# Patient Record
Sex: FEMALE | Race: WHITE | Employment: UNEMPLOYED | URBAN - METROPOLITAN AREA
[De-identification: names, ages, dates, MRNs, and addresses within clinical notes are randomized per-mention and may not be internally consistent; named-entity substitution may affect disease eponyms.]

---

## 2020-10-23 ENCOUNTER — HOSPITAL ENCOUNTER (EMERGENCY)
Age: 25
Discharge: HOME | End: 2020-10-23
Attending: EMERGENCY MEDICINE
Payer: COMMERCIAL

## 2020-10-23 VITALS
TEMPERATURE: 98.3 F | OXYGEN SATURATION: 100 % | DIASTOLIC BLOOD PRESSURE: 73 MMHG | RESPIRATION RATE: 12 BRPM | WEIGHT: 175 LBS | BODY MASS INDEX: 27.47 KG/M2 | SYSTOLIC BLOOD PRESSURE: 111 MMHG | HEART RATE: 71 BPM | HEIGHT: 67 IN

## 2020-10-23 PROCEDURE — 99282 EMERGENCY DEPT VISIT SF MDM: CPT

## 2020-10-23 RX ORDER — AMOXICILLIN 500 MG/1
500 CAPSULE ORAL 2 TIMES DAILY
Qty: 10 CAPSULE | Refills: 0 | Status: SHIPPED | OUTPATIENT
Start: 2020-10-23 | End: 2020-10-28

## 2020-10-23 RX ORDER — KETOROLAC TROMETHAMINE 10 MG/1
10 TABLET, FILM COATED ORAL EVERY 6 HOURS PRN
Qty: 20 TABLET | Refills: 0 | Status: SHIPPED | OUTPATIENT
Start: 2020-10-23

## 2020-10-23 ASSESSMENT — PAIN SCALES - GENERAL
PAINLEVEL_OUTOF10: 7
PAINLEVEL_OUTOF10: 7

## 2020-10-23 ASSESSMENT — PAIN DESCRIPTION - PAIN TYPE
TYPE: ACUTE PAIN
TYPE: ACUTE PAIN

## 2020-10-23 ASSESSMENT — PAIN DESCRIPTION - DESCRIPTORS
DESCRIPTORS: THROBBING
DESCRIPTORS: THROBBING

## 2020-10-23 ASSESSMENT — PAIN DESCRIPTION - LOCATION
LOCATION: TEETH
LOCATION: MOUTH

## 2020-10-23 ASSESSMENT — PAIN - FUNCTIONAL ASSESSMENT: PAIN_FUNCTIONAL_ASSESSMENT: 0-10

## 2020-10-23 ASSESSMENT — PAIN DESCRIPTION - ORIENTATION: ORIENTATION: LOWER;UPPER

## 2020-10-23 NOTE — ED PROVIDER NOTES
Triage Chief Complaint:   Dental Pain (wisdom teeth etracted, pain and swelling increasing. Dentist not answering phone, closed today for cleaning. Is 7 weeks post partum, breast feeding infant)    [place]:  Cinda Ng is a 26 y.o. female that presents for evaluation of dental pain where she had her 4 wisdom teeth extracted within the last week. She was not discharged with any antibiotics or medication for pain and she has been taking Tylenol home but states the pain is still bothersome. Of note she is postpartum and breast-feeding. She has no other complaints. ROS:  At least 9 systems reviewed and otherwise acutely negative except as in the [address] No past medical history on file. No past surgical history on file. No family history on file.   Social History     Socioeconomic History    Marital status:      Spouse name: Not on file    Number of children: Not on file    Years of education: Not on file    Highest education level: Not on file   Occupational History    Not on file   Social Needs    Financial resource strain: Not on file    Food insecurity     Worry: Not on file     Inability: Not on file    Transportation needs     Medical: Not on file     Non-medical: Not on file   Tobacco Use    Smoking status: Current Some Day Smoker    Smokeless tobacco: Never Used   Substance and Sexual Activity    Alcohol use: Yes     Comment: social    Drug use: No    Sexual activity: Not on file   Lifestyle    Physical activity     Days per week: Not on file     Minutes per session: Not on file    Stress: Not on file   Relationships    Social connections     Talks on phone: Not on file     Gets together: Not on file     Attends Orthodoxy service: Not on file     Active member of club or organization: Not on file     Attends meetings of clubs or organizations: Not on file     Relationship status: Not on file    Intimate partner violence     Fear of current or ex partner: Not on file Emotionally abused: Not on file     Physically abused: Not on file     Forced sexual activity: Not on file   Other Topics Concern    Not on file   Social History Narrative    Not on file     No current facility-administered medications for this encounter. Current Outpatient Medications   Medication Sig Dispense Refill    amoxicillin (AMOXIL) 500 MG capsule Take 1 capsule by mouth 2 times daily for 5 days 10 capsule 0    ketorolac (TORADOL) 10 MG tablet Take 1 tablet by mouth every 6 hours as needed for Pain 20 tablet 0     No Known Allergies    [unfilled]    Nursing Notes Reviewed    Physical Exam:  Vitals:    10/23/20 0916   BP: 111/73   Pulse: 71   Resp: 12   Temp: 98.3 °F (36.8 °C)   SpO2: 100%       GENERAL APPEARANCE: Awake and alert. Cooperative. No acute distress. HEAD: Normocephalic. Atraumatic. EYES: EOM's grossly intact. Sclera anicteric. ENT: Mucous membranes are moist. Tolerates saliva. No trismus. Patient is status post extraction of all 4 wisdom teeth and the sites are unremarkable with no pus no active bleeding  NECK: Supple. No meningismus. Trachea midline. HEART: RRR. Radial pulses 2+. LUNGS: Respirations unlabored. CTAB  ABDOMEN: Soft. Non-tender. No guarding or rebound. EXTREMITIES: No acute deformities. SKIN: Warm and dry. NEUROLOGICAL: No gross facial drooping. Moves all 4 extremities spontaneously. PSYCHIATRIC: Normal mood. I have reviewed and interpreted all of the currently available lab results from this visit (if applicable):  No results found for this visit on 10/23/20.      Radiographs (if obtained):  [] The following radiograph was interpreted by myself in the absence of a radiologist:  [x] Radiologist's Report Reviewed:  n/a    EKG (if obtained): (All EKG's are interpreted by myself in the absence of a cardiologist)  Initial EKG on my interpretation shows n/a    MDM:  Differential diagnosis: Postoperative pain, dental infection, dental pain    The extraction sites are fairly unremarkable however secondary to her pain I will prescribe her a short course of amoxicillin. I have also prescribed her Toradol for pain as I did offer her Norco but explained that all medications are to some degree present in breastmilk and therefore she opted for an agent that did not contain opiate so I told her that she can continue her acetaminophen and also alternate with doses of Toradol. Patient told to follow-up with her dentist who did the procedure for further evaluation. Patient did not have a hoarse voice. No goiter. There was no tenderness on thyroid. There was no cervical lymphadenopathy. There is no trismus. There was no Maxwell angina. Patient was speaking in complete sentences without difficulty. No respiratory distress. There was no JVD. There was no induration or fluctuation. There was no cellulitis. Abdomen was soft and nontender. Patient was neurovascularly intact. I feel patient is appropriate and safe for discharge home. Physical exam benign. Patient has no stridor and no airway compromise. The patient is adequately hydrated, clinically nontoxic, and does not have any findings that would suggest impending airway issues. I do not suspect peritonsillar abscess, retropharyngeal abscess, epiglottitis or other more emergent etiology. There is no hot potato voice. Patient is advised to follow-up with their family doctor within the next 24-48 hours and return to the emergency department with any concerns. Patient istructed that long-term poor management of dental conditions can lead to endocarditis, myocarditis, sepsis and even death. Discussed results, diagnosis and plan with patient and/or family. Questions addressed. Disposition and follow-up agreed upon. Specific discharge instructions explained.  The patient and/or family and I have discussed the diagnosis and risks, and we agree with discharging home to follow-up with their primary care, specialist or referral doctor. In the event that medications were prescribed the risk profile of these medications were detailed expressly. We also discussed returning to the Emergency Department immediately if new or worsening symptoms occur. We have discussed the symptoms which are most concerning that necessitate immediate return. Old records reviewed. Labs and imaging reviewed and results discussed with patient. .        Patient was given scripts for the following medications. I counseled patient how to take these medications. New Prescriptions    AMOXICILLIN (AMOXIL) 500 MG CAPSULE    Take 1 capsule by mouth 2 times daily for 5 days    KETOROLAC (TORADOL) 10 MG TABLET    Take 1 tablet by mouth every 6 hours as needed for Pain         CRITICAL CARE TIME   Total Critical Care time was 0 minutes, excluding separately reportable procedures. There was a high probability of clinically significant/life threatening deterioration in the patient's condition which required my urgent intervention. Clinical Impression:  1.  Pain, dental       (Please note that portions of this note may have been completed with a voice recognition program. Efforts were made to edit the dictations but occasionally words are mis-transcribed.)    MD Marci Thompson MD  10/23/20 5756

## 2020-10-24 ENCOUNTER — HOSPITAL ENCOUNTER (EMERGENCY)
Age: 25
Discharge: HOME | End: 2020-10-24
Attending: EMERGENCY MEDICINE
Payer: COMMERCIAL

## 2020-10-24 VITALS
WEIGHT: 180.56 LBS | BODY MASS INDEX: 28.34 KG/M2 | HEIGHT: 67 IN | HEART RATE: 62 BPM | TEMPERATURE: 97.9 F | OXYGEN SATURATION: 99 % | SYSTOLIC BLOOD PRESSURE: 110 MMHG | DIASTOLIC BLOOD PRESSURE: 70 MMHG | RESPIRATION RATE: 16 BRPM

## 2020-10-24 PROCEDURE — 99282 EMERGENCY DEPT VISIT SF MDM: CPT

## 2020-10-24 PROCEDURE — 6370000000 HC RX 637 (ALT 250 FOR IP): Performed by: EMERGENCY MEDICINE

## 2020-10-24 RX ORDER — HYDROCODONE BITARTRATE AND ACETAMINOPHEN 5; 325 MG/1; MG/1
1 TABLET ORAL ONCE
Status: COMPLETED | OUTPATIENT
Start: 2020-10-24 | End: 2020-10-24

## 2020-10-24 RX ORDER — HYDROCODONE BITARTRATE AND ACETAMINOPHEN 5; 325 MG/1; MG/1
1 TABLET ORAL EVERY 8 HOURS PRN
Qty: 10 TABLET | Refills: 0 | Status: SHIPPED | OUTPATIENT
Start: 2020-10-24 | End: 2020-10-27

## 2020-10-24 RX ADMIN — HYDROCODONE BITARTRATE AND ACETAMINOPHEN 1 TABLET: 5; 325 TABLET ORAL at 12:21

## 2020-10-24 ASSESSMENT — PAIN DESCRIPTION - FREQUENCY: FREQUENCY: CONTINUOUS

## 2020-10-24 ASSESSMENT — PAIN SCALES - GENERAL
PAINLEVEL_OUTOF10: 8
PAINLEVEL_OUTOF10: 8

## 2020-10-24 ASSESSMENT — PAIN DESCRIPTION - PAIN TYPE: TYPE: ACUTE PAIN

## 2020-10-24 ASSESSMENT — PAIN DESCRIPTION - LOCATION: LOCATION: MOUTH

## 2020-10-24 NOTE — ED PROVIDER NOTES
CHIEF COMPLAINT  Dental Pain (Had wisdom teeth removed Monday. Was here yesterday. States Toradol is not helping pain.)      HISTORY OF PRESENT ILLNESS  Elio Vora is a 26 y.o. female who presents to the ED complaining of pain in her mandible on the left side after having wisdom tooth extraction performed on 10/19/2020. Patient states that she try to go back to her dentist office yesterday but they were cleaned in the office and therefore not open. Patient states she was seen yesterday in the emergency room for evaluation and was started on amoxicillin as well as Toradol. Patient was offered Norco at that time but chose not to as she was breast-feeding. States she went home and tried using the Toradol without relief of her pain and therefore came back to the emergency room to discuss possible Norco prescription. Patient denies any fevers or chills. No nausea or vomiting. No difficulty swallowing. States she does have breastmilk reserve which she can use instead of breast-feeding while she is taking the pain medication. Does plan to follow-up with her dentist on Monday. No other complaints, modifying factors or associated symptoms. Nursing notes reviewed. History reviewed. No pertinent past medical history. Past Surgical History:   Procedure Laterality Date    APPENDECTOMY      WISDOM TOOTH EXTRACTION       History reviewed. No pertinent family history.   Social History     Socioeconomic History    Marital status:      Spouse name: Not on file    Number of children: Not on file    Years of education: Not on file    Highest education level: Not on file   Occupational History    Not on file   Social Needs    Financial resource strain: Not on file    Food insecurity     Worry: Not on file     Inability: Not on file    Transportation needs     Medical: Not on file     Non-medical: Not on file   Tobacco Use    Smoking status: Former Smoker    Smokeless tobacco: Never Used Substance and Sexual Activity    Alcohol use: Yes     Comment: social    Drug use: No    Sexual activity: Not on file   Lifestyle    Physical activity     Days per week: Not on file     Minutes per session: Not on file    Stress: Not on file   Relationships    Social connections     Talks on phone: Not on file     Gets together: Not on file     Attends Christian service: Not on file     Active member of club or organization: Not on file     Attends meetings of clubs or organizations: Not on file     Relationship status: Not on file    Intimate partner violence     Fear of current or ex partner: Not on file     Emotionally abused: Not on file     Physically abused: Not on file     Forced sexual activity: Not on file   Other Topics Concern    Not on file   Social History Narrative    Not on file     Current Facility-Administered Medications   Medication Dose Route Frequency Provider Last Rate Last Dose    HYDROcodone-acetaminophen (NORCO) 5-325 MG per tablet 1 tablet  1 tablet Oral Once Juan Dawn MD         Current Outpatient Medications   Medication Sig Dispense Refill    HYDROcodone-acetaminophen (NORCO) 5-325 MG per tablet Take 1 tablet by mouth every 8 hours as needed for Pain for up to 3 days. Intended supply: 3 days.  Take lowest dose possible to manage pain 10 tablet 0    amoxicillin (AMOXIL) 500 MG capsule Take 1 capsule by mouth 2 times daily for 5 days 10 capsule 0    ketorolac (TORADOL) 10 MG tablet Take 1 tablet by mouth every 6 hours as needed for Pain 20 tablet 0     No Known Allergies      REVIEW OF SYSTEMS  10 systems reviewed, pertinent positives per HPI otherwise noted to be negative    PHYSICAL EXAM  /70   Pulse 62   Temp 97.9 °F (36.6 °C) (Oral)   Resp 16   Ht 5' 7\" (1.702 m)   Wt 180 lb 8.9 oz (81.9 kg)   SpO2 99%   BMI 28.28 kg/m²      CONSTITUTIONAL: AOx4, cooperative with exam, afebrile   HEAD: normocephalic, atraumatic   EYES: PERRL, EOMI, anicteric sclera ENT: Moist mucous membranes, uvula midline, no swelling of the posterior pharynx, no evidence of abscess, trismus. Status post extraction of 4 wisdom teeth with sites unremarkable without purulence, fluctuance or bleeding   NECK: Supple, symmetric, trachea midline, no meningismus   LUNGS: Bilateral breath sounds, CTAB, no rales/ronchi/wheezes   CARDIOVASCULAR: RRR, normal S1/S2, no m/r/g, 2+ pulses throughout   ABDOMEN: Soft, non-tender, non-distended, +BS   NEUROLOGIC:  MAEx4, GCS 15   MUSCULOSKELETAL: No clubbing, cyanosis or edema   SKIN: No rash, pallor or wounds on exposed surfaces         RADIOLOGY  X-RAYS:  I have reviewed radiologic plain film image(s). ALL OTHER NON-PLAIN FILM IMAGES SUCH AS CT, ULTRASOUND AND MRI HAVE BEEN READ BY THE RADIOLOGIST. No orders to display          EKG INTERPRETATION  None    PROCEDURES    ED COURSE/MDM  Dry socket, postop pain, infection  Patient seen and evaluated. History and physical as above. Nontoxic, afebrile. Patient with postop pain after wisdom tooth removal.  No evidence of abscess, Vianey's angina or trismus. Patient tolerating her secretions and speaking full sentences. Will provide short course of Norco for pain control to bridge patient and through the weekend to get into see her dentist.  Did discuss that the opiate medication will be in patient's breastmilk and that she should pump and dump and use a reserve. Patient states understanding. Strict return instructions provided. All questions answered prior to discharge. I estimate there is LOW risk for a ANAPHYLAXIS, DEEP SPACE INFECTION (e.g., TAMIKOS ANGINA OR RETROPHARYNGEAL ABSCESS), EPIGLOTTITIS, MENINGITIS, or AIRWAY COMPROMISE, thus I consider the discharge disposition reasonable. Also, there is no evidence or peritonitis, sepsis, or toxicity. Jerzy and I have discussed the diagnosis and risks, and we agree with discharging home to follow-up with their primary doctor.  We also

## 2020-10-24 NOTE — ED NOTES
Acknowledged pt by pt's name. Verified pt by name and date of birth. Checked arm band, allergies, reviewed past medical history. Introduced myself to patient  Duration of ED plan of care explained to patient  Explained planned tests and procedures  Thanked patient for coming to [facility].    Asked if there was anything else I could do for the patient before exiting room. CB in reach.      Nimisha Jay RN  10/24/20 4677

## 2024-03-14 LAB
ABO, EXTERNAL RESULT: NORMAL
HEP B, EXTERNAL RESULT: NORMAL
HEPATITIS C ANTIBODY, EXTERNAL RESULT: NORMAL
HIV, EXTERNAL RESULT: NORMAL
RH FACTOR, EXTERNAL RESULT: NORMAL
RUBELLA TITER, EXTERNAL RESULT: NORMAL

## 2024-04-25 ENCOUNTER — APPOINTMENT (OUTPATIENT)
Dept: ULTRASOUND IMAGING | Age: 29
End: 2024-04-25
Payer: COMMERCIAL

## 2024-04-25 ENCOUNTER — HOSPITAL ENCOUNTER (OUTPATIENT)
Age: 29
Discharge: HOME | End: 2024-04-25
Attending: STUDENT IN AN ORGANIZED HEALTH CARE EDUCATION/TRAINING PROGRAM | Admitting: OBSTETRICS & GYNECOLOGY
Payer: COMMERCIAL

## 2024-04-25 VITALS
SYSTOLIC BLOOD PRESSURE: 105 MMHG | TEMPERATURE: 97.6 F | DIASTOLIC BLOOD PRESSURE: 59 MMHG | WEIGHT: 189.82 LBS | RESPIRATION RATE: 14 BRPM | HEIGHT: 67 IN | BODY MASS INDEX: 29.79 KG/M2 | HEART RATE: 71 BPM | OXYGEN SATURATION: 98 %

## 2024-04-25 DIAGNOSIS — R10.13 EPIGASTRIC PAIN: Primary | ICD-10-CM

## 2024-04-25 PROBLEM — K80.20 CALCULUS OF GALLBLADDER WITHOUT CHOLECYSTITIS WITHOUT OBSTRUCTION: Status: ACTIVE | Noted: 2024-04-25

## 2024-04-25 PROBLEM — R10.9 ABDOMINAL PAIN: Status: ACTIVE | Noted: 2024-04-25

## 2024-04-25 LAB
ABO + RH BLD: NORMAL
ALBUMIN SERPL-MCNC: 3.7 G/DL (ref 3.4–5)
ALP SERPL-CCNC: 69 U/L (ref 40–129)
ALT SERPL-CCNC: 51 U/L (ref 10–40)
AMORPHOUS: PRESENT
AMYLASE SERPL-CCNC: 66 U/L (ref 25–115)
AST SERPL-CCNC: 75 U/L (ref 15–37)
BACTERIA URNS QL MICRO: ABNORMAL /HPF
BILIRUB DIRECT SERPL-MCNC: <0.2 MG/DL (ref 0–0.3)
BILIRUB INDIRECT SERPL-MCNC: ABNORMAL MG/DL (ref 0–1)
BILIRUB SERPL-MCNC: 0.6 MG/DL (ref 0–1)
BILIRUB UR QL STRIP.AUTO: NEGATIVE
BLD GP AB SCN SERPL QL: NORMAL
CLARITY UR: ABNORMAL
COLOR UR: YELLOW
DEPRECATED RDW RBC AUTO: 14.3 % (ref 12.4–15.4)
EPI CELLS #/AREA URNS AUTO: 4 /HPF (ref 0–5)
GLUCOSE UR STRIP.AUTO-MCNC: NEGATIVE MG/DL
HCT VFR BLD AUTO: 33.3 % (ref 36–48)
HGB BLD-MCNC: 11.6 G/DL (ref 12–16)
HGB UR QL STRIP.AUTO: NEGATIVE
HYALINE CASTS #/AREA URNS AUTO: 0 /LPF (ref 0–8)
KETONES UR STRIP.AUTO-MCNC: NEGATIVE MG/DL
LEUKOCYTE ESTERASE UR QL STRIP.AUTO: ABNORMAL
LIPASE SERPL-CCNC: 20 U/L (ref 13–60)
MCH RBC QN AUTO: 29.1 PG (ref 26–34)
MCHC RBC AUTO-ENTMCNC: 34.8 G/DL (ref 31–36)
MCV RBC AUTO: 83.5 FL (ref 80–100)
NITRITE UR QL STRIP.AUTO: NEGATIVE
PH UR STRIP.AUTO: 8 [PH] (ref 5–8)
PLATELET # BLD AUTO: 213 K/UL (ref 135–450)
PMV BLD AUTO: 8.4 FL (ref 5–10.5)
PROT SERPL-MCNC: 6.5 G/DL (ref 6.4–8.2)
PROT UR STRIP.AUTO-MCNC: NEGATIVE MG/DL
RBC # BLD AUTO: 3.98 M/UL (ref 4–5.2)
RBC CLUMPS #/AREA URNS AUTO: 2 /HPF (ref 0–4)
SP GR UR STRIP.AUTO: 1.01 (ref 1–1.03)
UA DIPSTICK W REFLEX MICRO PNL UR: YES
URN SPEC COLLECT METH UR: ABNORMAL
UROBILINOGEN UR STRIP-ACNC: 1 E.U./DL
WBC # BLD AUTO: 8.9 K/UL (ref 4–11)
WBC #/AREA URNS AUTO: 1 /HPF (ref 0–5)

## 2024-04-25 PROCEDURE — 96361 HYDRATE IV INFUSION ADD-ON: CPT

## 2024-04-25 PROCEDURE — 82150 ASSAY OF AMYLASE: CPT

## 2024-04-25 PROCEDURE — 99202 OFFICE O/P NEW SF 15 MIN: CPT

## 2024-04-25 PROCEDURE — 76705 ECHO EXAM OF ABDOMEN: CPT

## 2024-04-25 PROCEDURE — 2580000003 HC RX 258: Performed by: OBSTETRICS & GYNECOLOGY

## 2024-04-25 PROCEDURE — 86901 BLOOD TYPING SEROLOGIC RH(D): CPT

## 2024-04-25 PROCEDURE — 83690 ASSAY OF LIPASE: CPT

## 2024-04-25 PROCEDURE — 2500000003 HC RX 250 WO HCPCS: Performed by: OBSTETRICS & GYNECOLOGY

## 2024-04-25 PROCEDURE — 6360000002 HC RX W HCPCS: Performed by: OBSTETRICS & GYNECOLOGY

## 2024-04-25 PROCEDURE — 85027 COMPLETE CBC AUTOMATED: CPT

## 2024-04-25 PROCEDURE — 86900 BLOOD TYPING SEROLOGIC ABO: CPT

## 2024-04-25 PROCEDURE — APPNB15 APP NON BILLABLE TIME 0-15 MINS: Performed by: PHYSICIAN ASSISTANT

## 2024-04-25 PROCEDURE — A4216 STERILE WATER/SALINE, 10 ML: HCPCS | Performed by: OBSTETRICS & GYNECOLOGY

## 2024-04-25 PROCEDURE — 96375 TX/PRO/DX INJ NEW DRUG ADDON: CPT

## 2024-04-25 PROCEDURE — 99285 EMERGENCY DEPT VISIT HI MDM: CPT

## 2024-04-25 PROCEDURE — 86850 RBC ANTIBODY SCREEN: CPT

## 2024-04-25 PROCEDURE — 96365 THER/PROPH/DIAG IV INF INIT: CPT

## 2024-04-25 PROCEDURE — 80076 HEPATIC FUNCTION PANEL: CPT

## 2024-04-25 PROCEDURE — 99254 IP/OBS CNSLTJ NEW/EST MOD 60: CPT | Performed by: STUDENT IN AN ORGANIZED HEALTH CARE EDUCATION/TRAINING PROGRAM

## 2024-04-25 PROCEDURE — 81001 URINALYSIS AUTO W/SCOPE: CPT

## 2024-04-25 PROCEDURE — APPSS15 APP SPLIT SHARED TIME 0-15 MINUTES: Performed by: PHYSICIAN ASSISTANT

## 2024-04-25 RX ORDER — ONDANSETRON 2 MG/ML
4 INJECTION INTRAMUSCULAR; INTRAVENOUS ONCE
Status: COMPLETED | OUTPATIENT
Start: 2024-04-25 | End: 2024-04-25

## 2024-04-25 RX ORDER — SODIUM CHLORIDE, SODIUM LACTATE, POTASSIUM CHLORIDE, AND CALCIUM CHLORIDE .6; .31; .03; .02 G/100ML; G/100ML; G/100ML; G/100ML
1000 INJECTION, SOLUTION INTRAVENOUS ONCE
Status: COMPLETED | OUTPATIENT
Start: 2024-04-25 | End: 2024-04-25

## 2024-04-25 RX ORDER — SODIUM CHLORIDE, SODIUM LACTATE, POTASSIUM CHLORIDE, CALCIUM CHLORIDE 600; 310; 30; 20 MG/100ML; MG/100ML; MG/100ML; MG/100ML
INJECTION, SOLUTION INTRAVENOUS CONTINUOUS
Status: DISCONTINUED | OUTPATIENT
Start: 2024-04-25 | End: 2024-04-25 | Stop reason: HOSPADM

## 2024-04-25 RX ORDER — ONDANSETRON 4 MG/1
4 TABLET, FILM COATED ORAL DAILY PRN
Qty: 30 TABLET | Refills: 0 | Status: SHIPPED | OUTPATIENT
Start: 2024-04-25

## 2024-04-25 RX ADMIN — HYDROMORPHONE HYDROCHLORIDE 0.5 MG: 1 INJECTION, SOLUTION INTRAMUSCULAR; INTRAVENOUS; SUBCUTANEOUS at 06:37

## 2024-04-25 RX ADMIN — FAMOTIDINE 20 MG: 10 INJECTION, SOLUTION INTRAVENOUS at 06:40

## 2024-04-25 RX ADMIN — ONDANSETRON 4 MG: 2 INJECTION INTRAMUSCULAR; INTRAVENOUS at 06:39

## 2024-04-25 RX ADMIN — SODIUM CHLORIDE, POTASSIUM CHLORIDE, SODIUM LACTATE AND CALCIUM CHLORIDE 1000 ML: 600; 310; 30; 20 INJECTION, SOLUTION INTRAVENOUS at 06:41

## 2024-04-25 RX ADMIN — Medication 12.5 MG: at 10:05

## 2024-04-25 RX ADMIN — SODIUM CHLORIDE, POTASSIUM CHLORIDE, SODIUM LACTATE AND CALCIUM CHLORIDE: 600; 310; 30; 20 INJECTION, SOLUTION INTRAVENOUS at 10:04

## 2024-04-25 ASSESSMENT — PAIN DESCRIPTION - DESCRIPTORS: DESCRIPTORS: ACHING;DULL

## 2024-04-25 ASSESSMENT — PAIN DESCRIPTION - LOCATION: LOCATION: OTHER (COMMENT)

## 2024-04-25 ASSESSMENT — PAIN SCALES - GENERAL: PAINLEVEL_OUTOF10: 5

## 2024-04-25 NOTE — PLAN OF CARE
Problem: Pain  Goal: Verbalizes/displays adequate comfort level or baseline comfort level  4/25/2024 1344 by Rosalia Ro, RN  Outcome: Completed  4/25/2024 0746 by Rosalia Ro, RN  Outcome: Progressing

## 2024-04-25 NOTE — ED NOTES
This RN spoke to 2N OB Charge RN regarding patient's transport to OB. States will come and get patient from ED.

## 2024-04-25 NOTE — PROGRESS NOTES
Pt still c/o nausea and vomiting.  RUQ u/s showing cholelithiasis.   LFTs are elevated.  Will consult general surgery.

## 2024-04-25 NOTE — FLOWSHEET NOTE
Patient to triage with c/o Epigastric pain that radiates to RUQ.  Last ate at 2100,  Emesis x2 with last episode at 0100.  Denies diarrhea. Denies vaginal bleeding.  Verbalized positive FM.  Confirmed FHR of 140's.  Denies contractions, palpates soft.  Rebound tenderness and guarding noted to RUQ.

## 2024-04-25 NOTE — ED TRIAGE NOTES
Pt presents to ED with a c/o upper abd pain that started around 2130 last night. Around 0100 this morning \"the pain started to get really bad\". Pt is currently 21 weeks 3 days pregnant. Pt also reports vomiting x2 at 0100. Pt denies blood in vomit. Pt denies diarrhea. 6/10 pain in upper abdomen; describes pain as a \"gas pain\" and states it's a constant dull pain. Pt denies vaginal bleeding. Pt states she's only tried TUMs at home.

## 2024-04-25 NOTE — FLOWSHEET NOTE
Dr Potts updated regarding patient assessment.  Dr Potts spoke with patient.  RUQ ultrasound ordered to r/o gallstones.  Patient advised of POC and verbalized agreement at this time.

## 2024-04-25 NOTE — FLOWSHEET NOTE
Pt remains NPO pending surgery consult. IVF started and dose of phenergan given for c/o nausea and vomiting. See mar

## 2024-04-25 NOTE — PROGRESS NOTES
Pain is better, just dull. Nausea persists.   After discussion with gen surg, plan is to d/c home, low fat diet. Pt will call if sx worsen again. Will attempt to delay surgery until after pregnancy.

## 2024-04-25 NOTE — PROGRESS NOTES
27 yo  @ 21 weeks presented to ER with c/p epig/RUQ pain, heart tali, nausea and 2 episodes of emesis. Had spicy food for dinner last night. Had GB issues with last pregnancy. No pregnancy related complains, denies LOF, ctrxs, VB, baby is active.   Was seen in ER earlier but sent to us for management.   Will get liver panel, treat nausea, heartburn and pain, which is 6/10 and pretty intense. GB US this am.

## 2024-04-25 NOTE — CONSULTS
Surgery Consult Note     HANSA Valerio,-C  Pt Name: Trace Argueta  MRN: [id number]  YOB: 1995  Date of evaluation: 4/25/2024  Primary Care Physician: No primary care provider on file.  Referring Physician. Dr. Johnson  Reason for Consultation: Cholelithiasis  Chief Complaint:Abdominal pain  IMPRESSIONS:   Abdominal pain. +Cholelithiasis on Ultrasound. Pain: gallbladder vs GERD?  Nausea and emesis  21 weeks pregnant   LFT's OK. ALT and AST slight elevated (ALT: 51 and AST: 75)  WBC count WNL: 8.9  PLANS:   Monitor and control pain  IVF  No general surgery plans at this time  Will monitor and give further recommendations as needed.   SUBJECTIVE:   History of Chief Complaint:    Trace Argueta is a 28 y.o. female who presented with abdominal pain, nausea and emesis. Her pain began yesterday and is located in the epigastric area. She is currently 21 weeks pregnant and had similar pain with her last pregnancy that resolved when she was no longer pregnant. An ultrasound was performed and that showed cholelithiasis but no signs of cholecystitis.  She does admits that the pain radiates to her back. She jese ever having an EGD in the past.   Past Medical History  Reviewed  has no past medical history on file.  Past Surgical History  Reviewed has a past surgical history that includes Appendectomy and Cando tooth extraction.  Medications  Prior to Admission medications    Medication Sig Start Date End Date Taking? Authorizing Provider   ketorolac (TORADOL) 10 MG tablet Take 1 tablet by mouth every 6 hours as needed for Pain  Patient not taking: Reported on 4/25/2024 10/23/20   Agustín Molina MD    Scheduled Meds:  Continuous Infusions:   lactated ringers IV soln       PRN Meds:.promethazine  Allergies  has No Known Allergies.  Family History  Reviewed. Noncontributory to current situation  Social History   reports that she has quit smoking. She has never used smokeless tobacco. She reports current  0.6   BILIDIR <0.2   AMYLASE 66   LIPASE 20.0     CBC:   Lab Results   Component Value Date/Time    WBC 8.9 04/25/2024 06:30 AM    RBC 3.98 04/25/2024 06:30 AM    HGB 11.6 04/25/2024 06:30 AM    HCT 33.3 04/25/2024 06:30 AM    MCV 83.5 04/25/2024 06:30 AM    MCH 29.1 04/25/2024 06:30 AM    MCHC 34.8 04/25/2024 06:30 AM    RDW 14.3 04/25/2024 06:30 AM     04/25/2024 06:30 AM    MPV 8.4 04/25/2024 06:30 AM     CMP:    Lab Results   Component Value Date/Time     04/26/2016 07:15 PM    K 3.9 04/26/2016 07:15 PM    CL 97 04/26/2016 07:15 PM    CO2 22 04/26/2016 07:15 PM    BUN 8 04/26/2016 07:15 PM    CREATININE 0.7 04/26/2016 07:15 PM    GFRAA >60 04/26/2016 07:15 PM    AGRATIO 1.2 04/26/2016 07:15 PM    LABGLOM >60 04/26/2016 07:15 PM    GLUCOSE 145 04/26/2016 07:15 PM    PROT 6.5 04/25/2024 06:30 AM    CALCIUM 9.5 04/26/2016 07:15 PM    BILITOT 0.6 04/25/2024 06:30 AM    ALKPHOS 69 04/25/2024 06:30 AM    AST 75 04/25/2024 06:30 AM    ALT 51 04/25/2024 06:30 AM     Urine Culture:  No components found for: \"CURINE\"  Blood Culture:  No components found for: \"CBLOOD\", \"CFUNGUSBL\"  RADIOLOGY:     US (4/25/24):   Cholelithiasis, without sonographic evidence of acute cholecystitis.     The liver measures in the upper limits of normal size.         Thank you for the interesting evaluation. Further recommendations to follow.    Deng Lim PA-C  General and Vascular Surgery [phone]  Electronically signed by Deng Taylor PA-C on 4/25/2024 at 10:02 AM

## 2024-04-25 NOTE — FLOWSHEET NOTE
After completion of the medical screening evaluation, it has been determined that a medical emergency does not exist and the patient is not in active labor. Therefore, the patient may be discharged home.    Patient given triage discharge instructions.  Patient instructed to drink plenty water everyday, to begin low fat diet and return to normal activity. Script sent to pt's pharmacy.  All pregnancy warning signs and symptoms reviewed.   Patient aware of when to follow up with providers and all questions answered.  Patient ambulatory home with instructions.

## 2024-04-25 NOTE — ED PROVIDER NOTES
--     Vitals:    04/25/24 0423   BP: 110/68   Pulse: 70   Resp: 18   Temp: 97.6 °F (36.4 °C)   SpO2: 98%         Physical Exam  Vitals and nursing note reviewed.   Constitutional:       General: She is not in acute distress.     Appearance: She is not ill-appearing, toxic-appearing or diaphoretic.   HENT:      Head: Normocephalic and atraumatic.      Right Ear: External ear normal.      Left Ear: External ear normal.      Nose: Nose normal.   Eyes:      Conjunctiva/sclera: Conjunctivae normal.   Cardiovascular:      Rate and Rhythm: Normal rate and regular rhythm.      Pulses: Normal pulses.      Heart sounds: Normal heart sounds. No murmur heard.     No friction rub. No gallop.   Pulmonary:      Effort: Pulmonary effort is normal. No respiratory distress.      Breath sounds: No stridor. No wheezing, rhonchi or rales.   Abdominal:      General: Abdomen is flat. Bowel sounds are normal.      Palpations: Abdomen is soft.      Tenderness: There is abdominal tenderness. There is no guarding or rebound.      Comments: Minimally gravid abdomen, epigastric tenderness to palpation   Skin:     General: Skin is warm and dry.      Capillary Refill: Capillary refill takes less than 2 seconds.   Neurological:      Mental Status: She is alert.           FORMAL DIAGNOSTIC RESULTS     EKG: All EKG's are interpreted by the Emergency Department Physician who either signs or Co-signs this chart in the absence of a cardiologist.        RADIOLOGY:   Non-plain film images such as CT, Ultrasound and MRI are read by the radiologist. Plainradiographic images are visualized and preliminarily interpreted by the  ED Provider with the belowfindings:        Interpretation per the Radiologist below, if available at the time of this note:    No orders to display     No results found.   No results found.     LABS: (none if blank)  Labs Reviewed - No data to display    (Any cultures that may have been sent were not resulted at the time of this        OUTPATIENT FOLLOW UP THE PATIENT:  No follow-up provider specified.      Electronically Signed: Madhav Wright MD, 04/25/24, 5:08 AM    This report has been produced using speech recognition software and may contain errors related to that system including errors in grammar, punctuation, and spelling, as well as words and phrases that may be inappropriate. If there are any questions or concerns please feel free to contact the dictating provider for clarification.       Madhav Wright MD  04/25/24 6128

## 2024-04-29 ENCOUNTER — TELEPHONE (OUTPATIENT)
Dept: SURGERY | Age: 29
End: 2024-04-29

## 2024-04-29 NOTE — TELEPHONE ENCOUNTER
PT was seen in ER over the weekend for gallstones and was told by Dr Perez if things get worse to call and let him know.  She states that she is feeling worse now and would like to know what the next plan of care would be.

## 2024-04-30 ENCOUNTER — OFFICE VISIT (OUTPATIENT)
Dept: SURGERY | Age: 29
End: 2024-04-30
Payer: COMMERCIAL

## 2024-04-30 VITALS — WEIGHT: 189 LBS | BODY MASS INDEX: 29.6 KG/M2

## 2024-04-30 DIAGNOSIS — K80.20 CALCULUS OF GALLBLADDER WITHOUT CHOLECYSTITIS WITHOUT OBSTRUCTION: Primary | ICD-10-CM

## 2024-04-30 PROCEDURE — G8419 CALC BMI OUT NRM PARAM NOF/U: HCPCS | Performed by: STUDENT IN AN ORGANIZED HEALTH CARE EDUCATION/TRAINING PROGRAM

## 2024-04-30 PROCEDURE — 1036F TOBACCO NON-USER: CPT | Performed by: STUDENT IN AN ORGANIZED HEALTH CARE EDUCATION/TRAINING PROGRAM

## 2024-04-30 PROCEDURE — 99215 OFFICE O/P EST HI 40 MIN: CPT | Performed by: STUDENT IN AN ORGANIZED HEALTH CARE EDUCATION/TRAINING PROGRAM

## 2024-04-30 PROCEDURE — G8427 DOCREV CUR MEDS BY ELIG CLIN: HCPCS | Performed by: STUDENT IN AN ORGANIZED HEALTH CARE EDUCATION/TRAINING PROGRAM

## 2024-04-30 NOTE — PROGRESS NOTES
GENERAL SURGERY  Follow-up Office Visit    Patient Name: Trace Argueta  MRN: [id number]  YOB: 1995   Date of Service: 4/30/2024      Chief Complaint   Patient presents with    New Patient     Ref by ED for gallbladder. Pt states she was seen in the ED due to having gallbladder symptoms, she has had couple more \"attacks\" after she was seen in the ED.       SUBJECTIVE:     Trace presents for follow-up of her gallbladder disease.  Of note, she is 21 weeks pregnant with her second child.  She had the similar issues during her first pregnancy, but they are worse and more prominent with this pregnancy.  She was seen by me recently in OB triage for the same.    After our last visit, she had another gallbladder attack on Friday of last week.  This persisted through the majority of the weekend.  She has not had another clear-cut attacks since that time.  She continues to report upper abdominal pain that is worse with eating.  For that reason, she has steered clear of oral intake.      REVIEW OF SYSTEMS  A comprehensive review of systems was completed and is negative except for any elements noted above.    No past medical history on file.  Past Surgical History:   Procedure Laterality Date    APPENDECTOMY      WISDOM TOOTH EXTRACTION       Prior to Admission medications    Medication Sig Start Date End Date Taking? Authorizing Provider   ondansetron (ZOFRAN) 4 MG tablet Take 1 tablet by mouth daily as needed for Nausea or Vomiting 4/25/24  Yes Lisseth Johnson MD   ketorolac (TORADOL) 10 MG tablet Take 1 tablet by mouth every 6 hours as needed for Pain  Patient not taking: Reported on 4/25/2024 10/23/20   Agustín Molina MD     Social History     Tobacco Use    Smoking status: Former    Smokeless tobacco: Never   Substance Use Topics    Alcohol use: Yes     Comment: social    Drug use: No     No family history on file.    OBJECTIVE:     Wt 85.7 kg (189 lb)   BMI 29.60 kg/m²     PHYSICAL

## 2024-05-01 ENCOUNTER — TELEPHONE (OUTPATIENT)
Dept: SURGERY | Age: 29
End: 2024-05-01

## 2024-05-01 NOTE — TELEPHONE ENCOUNTER
Pt saw Dr. Mccormick for consultation regarding her gallbladder while being pregnant. She is symptomatic and has been in and out of the ED because of this. She stated she feels more comfortable with Dr. Dover as he has more referrals, however next available appt is not until 5/13. Pt wants to know if she can be seen sooner and how long before she can get scheduled for surgery, she is aware she will hear from Shirley tomorrow.

## 2024-05-02 NOTE — TELEPHONE ENCOUNTER
Will reach out to her and schedule her to come in on Monday.     Patient is currently 22 weeks pregnant.

## 2024-05-06 ENCOUNTER — OFFICE VISIT (OUTPATIENT)
Dept: SURGERY | Age: 29
End: 2024-05-06
Payer: COMMERCIAL

## 2024-05-06 VITALS — SYSTOLIC BLOOD PRESSURE: 102 MMHG | DIASTOLIC BLOOD PRESSURE: 68 MMHG | HEART RATE: 84 BPM

## 2024-05-06 DIAGNOSIS — Z3A.23 23 WEEKS GESTATION OF PREGNANCY: ICD-10-CM

## 2024-05-06 DIAGNOSIS — K80.20 SYMPTOMATIC CHOLELITHIASIS: Primary | ICD-10-CM

## 2024-05-06 PROCEDURE — G8419 CALC BMI OUT NRM PARAM NOF/U: HCPCS | Performed by: SURGERY

## 2024-05-06 PROCEDURE — 99214 OFFICE O/P EST MOD 30 MIN: CPT | Performed by: SURGERY

## 2024-05-06 PROCEDURE — 1036F TOBACCO NON-USER: CPT | Performed by: SURGERY

## 2024-05-06 PROCEDURE — G8427 DOCREV CUR MEDS BY ELIG CLIN: HCPCS | Performed by: SURGERY

## 2024-05-06 RX ORDER — FERROUS SULFATE 325(65) MG
325 TABLET ORAL
COMMUNITY

## 2024-05-06 RX ORDER — M-VIT,TX,IRON,MINS/CALC/FOLIC 27MG-0.4MG
1 TABLET ORAL DAILY
COMMUNITY

## 2024-05-06 NOTE — PROGRESS NOTES
Results   Component Value Date/Time    WBC 8.9 2024 06:30 AM    RBC 3.98 2024 06:30 AM    HGB 11.6 2024 06:30 AM    HCT 33.3 2024 06:30 AM     2024 06:30 AM    MCV 83.5 2024 06:30 AM    MCH 29.1 2024 06:30 AM    MCHC 34.8 2024 06:30 AM    RDW 14.3 2024 06:30 AM    LYMPHOPCT 10.5 2016 07:15 PM    MONOPCT 6.6 2016 07:15 PM    EOSPCT 0.1 2016 07:15 PM    BASOPCT 0.1 2016 07:15 PM    MONOSABS 0.6 2016 07:15 PM    EOSABS 0.0 2016 07:15 PM    BASOSABS 0.0 2016 07:15 PM     BMP:    Lab Results   Component Value Date/Time     2016 07:15 PM    K 3.9 2016 07:15 PM    CL 97 2016 07:15 PM    CO2 22 2016 07:15 PM    BUN 8 2016 07:15 PM    CREATININE 0.7 2016 07:15 PM    CALCIUM 9.5 2016 07:15 PM    GFRAA >60 2016 07:15 PM    LABGLOM >60 2016 07:15 PM    GLUCOSE 145 2016 07:15 PM     Hepatic Function Panel:    Lab Results   Component Value Date/Time    ALKPHOS 69 2024 06:30 AM    ALT 51 2024 06:30 AM    AST 75 2024 06:30 AM    BILITOT 0.6 2024 06:30 AM    BILIDIR <0.2 2024 06:30 AM    IBILI see below 2024 06:30 AM       Imaging: RUQ US from 24 shows cholelithiasis, no GB wall thickening or pericholecystic fluid, CBD 2 mm    ASSESSMENT:     Diagnosis Orders   1. Symptomatic cholelithiasis        2. 23 weeks gestation of pregnancy              PLAN:    Ms. Argueta is 23 weeks pregnant with symptomatic cholelithiasis.  She is having significant episodes that are influencing her weight gain during pregnancy.  She is also at risk of acute cholecystitis, which may also cause her fetal demise or  labor.  We will schedule the patient for a laparoscopic cholecystectomy with intraoperative cholangiogram, possible open.  The technical aspects, risks, benefits and complications of the procedure were discussed with the patient.

## 2024-05-06 NOTE — PROGRESS NOTES
[facility] Pre-Admission Testing Electronic Communication Worksheet for OR/ENDO Procedures        Patient: Trace Argueta    DOS: 5/9    Transportation Confirmed: [x] YES    []  NO    History and Physical:  [x] YES per [employer]        []  NO  [] N/A  If yes, please list doctor or Urgent Care and date of H&P:     Additional Clearance(Cardiac, Pulmonary, etc):  [] YES    [x]  NO    Pre-Admission Testing Visit:  [] YES    [x]  NO If no, do labs/testing need to be done DOS?  [] YES    []  NO    Medication Reconciliation Complete:  [x] YES    []  NO        Additional Notes:    Fetal monitoring set up see epic note              Interview Complete: [x] YES    []  NO          Lani Taylor RN  2:13 PM

## 2024-05-06 NOTE — PATIENT INSTRUCTIONS
Surgeon: Earl Dover MD  Your surgery will be at [facility]  First floor of the [facility]- SURGERY DESK  [address]  [place], [place] [address]    Date:    Arrival time:    Surgery time:     (   ) Outpatient with general anesthesia         You will need to have a  over the age of 18 with a drivers license drive you home.   You will need someone to stay with you for 24 hours after surgery due to anaesthesia.  Nothing to eat or drink after midnight the night before. FASTING SURGERY   You may take all your regular maintenance prescriptions in the morning with a small sip of water to wash them down.   No driving for 1 week after surgery. (Or while on pain medication)     Restrictions: 15lbs weight restrictions for 4 weeks after surgery.    You will need to bring a photo ID and insurance card the day of surgery.     Preadmission testing (PAT) will call prior to your surgery to complete your hospital interview/ questionnaire.     [facility] PAT phone number:  [phone]     [facility] PAT fax number:  [phone]    Please contact [facility] if you need to reschedule your surgery.   Office phone number:     [phone]  Fax number for [place]:    [phone]

## 2024-05-06 NOTE — PROGRESS NOTES
[facility] PRE-OPERATIVE INSTRUCTIONS    Day of Procedure:    5/9            Arrival time:    6            Surgery time:730    Take the following medications with a sip of water:  Follow your MD/Surgeons pre-procedure instructions regarding your medications     Do not eat or drink anything after 12:00 midnight prior to your surgery.  This includes water chewing gum, mints and ice chips.   You may brush your teeth and gargle the morning of your surgery, but do not swallow the water     Please see your family doctor/pediatrician for a history and physical and/or concerning medications.   Bring any test results/reports from your physicians office.   If you are under the care of a heart doctor or specialist doctor, please be aware that you may be asked to them for clearance    You may be asked to stop blood thinners such as Coumadin, Plavix, Fragmin, Lovenox, etc., or any anti-inflammatories such as:  Aspirin, Ibuprofen, Advil, Naproxen prior to your surgery.    We also ask that you stop any OTC medications such as fish oil, vitamin E, glucosamine, garlic, Multivitamins, COQ 10, etc.    We ask that you do not smoke 24 hours prior to surgery  We ask that you do not  drink any alcoholic beverages 24 hours prior to surgery     You must make arrangements for a responsible adult to take you home after your surgery.    For your safety you will not be allowed to leave alone or drive yourself home.  Your surgery will be cancelled if you do not have a ride home.     Also for your safety, it is strongly suggested that someone stay with you the first 24 hours after your surgery.     A parent or legal guardian must accompany a child scheduled for surgery and plan to stay at the hospital until the child is discharged.    Please do not bring other children with you.    For your comfort, please wear simple loose fitting clothing to the hospital.  Please do not bring valuables.    Do not wear any make-up or nail polish on

## 2024-05-08 ENCOUNTER — ANESTHESIA EVENT (OUTPATIENT)
Dept: OPERATING ROOM | Age: 29
End: 2024-05-08
Payer: COMMERCIAL

## 2024-05-09 ENCOUNTER — HOSPITAL ENCOUNTER (OUTPATIENT)
Age: 29
Setting detail: OUTPATIENT SURGERY
Discharge: HOME | End: 2024-05-09
Attending: SURGERY | Admitting: SURGERY
Payer: COMMERCIAL

## 2024-05-09 ENCOUNTER — ANESTHESIA (OUTPATIENT)
Dept: OPERATING ROOM | Age: 29
End: 2024-05-09
Payer: COMMERCIAL

## 2024-05-09 ENCOUNTER — APPOINTMENT (OUTPATIENT)
Dept: GENERAL RADIOLOGY | Age: 29
End: 2024-05-09
Attending: SURGERY
Payer: COMMERCIAL

## 2024-05-09 VITALS
OXYGEN SATURATION: 99 % | SYSTOLIC BLOOD PRESSURE: 128 MMHG | DIASTOLIC BLOOD PRESSURE: 58 MMHG | BODY MASS INDEX: 29.76 KG/M2 | HEIGHT: 67 IN | TEMPERATURE: 97.5 F | HEART RATE: 67 BPM | RESPIRATION RATE: 16 BRPM | WEIGHT: 189.6 LBS

## 2024-05-09 DIAGNOSIS — K80.20 SYMPTOMATIC CHOLELITHIASIS: Primary | ICD-10-CM

## 2024-05-09 PROCEDURE — 2580000003 HC RX 258: Performed by: SURGERY

## 2024-05-09 PROCEDURE — 2500000003 HC RX 250 WO HCPCS: Performed by: ANESTHESIOLOGY

## 2024-05-09 PROCEDURE — 74300 X-RAY BILE DUCTS/PANCREAS: CPT

## 2024-05-09 PROCEDURE — 6360000002 HC RX W HCPCS: Performed by: SURGERY

## 2024-05-09 PROCEDURE — 3700000000 HC ANESTHESIA ATTENDED CARE: Performed by: SURGERY

## 2024-05-09 PROCEDURE — 6360000002 HC RX W HCPCS

## 2024-05-09 PROCEDURE — 7100000011 HC PHASE II RECOVERY - ADDTL 15 MIN: Performed by: SURGERY

## 2024-05-09 PROCEDURE — 7100000001 HC PACU RECOVERY - ADDTL 15 MIN: Performed by: SURGERY

## 2024-05-09 PROCEDURE — 3600000004 HC SURGERY LEVEL 4 BASE: Performed by: SURGERY

## 2024-05-09 PROCEDURE — 2580000003 HC RX 258: Performed by: ANESTHESIOLOGY

## 2024-05-09 PROCEDURE — A4217 STERILE WATER/SALINE, 500 ML: HCPCS | Performed by: SURGERY

## 2024-05-09 PROCEDURE — 3600000014 HC SURGERY LEVEL 4 ADDTL 15MIN: Performed by: SURGERY

## 2024-05-09 PROCEDURE — 2500000003 HC RX 250 WO HCPCS

## 2024-05-09 PROCEDURE — 3700000001 HC ADD 15 MINUTES (ANESTHESIA): Performed by: SURGERY

## 2024-05-09 PROCEDURE — 6360000002 HC RX W HCPCS: Performed by: ANESTHESIOLOGY

## 2024-05-09 PROCEDURE — 2709999900 HC NON-CHARGEABLE SUPPLY: Performed by: SURGERY

## 2024-05-09 PROCEDURE — 6360000004 HC RX CONTRAST MEDICATION: Performed by: SURGERY

## 2024-05-09 PROCEDURE — 7100000010 HC PHASE II RECOVERY - FIRST 15 MIN: Performed by: SURGERY

## 2024-05-09 PROCEDURE — 88304 TISSUE EXAM BY PATHOLOGIST: CPT

## 2024-05-09 PROCEDURE — 6370000000 HC RX 637 (ALT 250 FOR IP): Performed by: ANESTHESIOLOGY

## 2024-05-09 PROCEDURE — 47563 LAPARO CHOLECYSTECTOMY/GRAPH: CPT | Performed by: SURGERY

## 2024-05-09 PROCEDURE — 7100000000 HC PACU RECOVERY - FIRST 15 MIN: Performed by: SURGERY

## 2024-05-09 RX ORDER — DIPHENHYDRAMINE HYDROCHLORIDE 50 MG/ML
12.5 INJECTION INTRAMUSCULAR; INTRAVENOUS
Status: COMPLETED | OUTPATIENT
Start: 2024-05-09 | End: 2024-05-09

## 2024-05-09 RX ORDER — SODIUM CHLORIDE 0.9 % (FLUSH) 0.9 %
5-40 SYRINGE (ML) INJECTION EVERY 12 HOURS SCHEDULED
Status: DISCONTINUED | OUTPATIENT
Start: 2024-05-09 | End: 2024-05-09 | Stop reason: HOSPADM

## 2024-05-09 RX ORDER — NEOSTIGMINE METHYLSULFATE 1 MG/ML
INJECTION, SOLUTION INTRAVENOUS PRN
Status: DISCONTINUED | OUTPATIENT
Start: 2024-05-09 | End: 2024-05-09 | Stop reason: SDUPTHER

## 2024-05-09 RX ORDER — PROPOFOL 10 MG/ML
INJECTION, EMULSION INTRAVENOUS PRN
Status: DISCONTINUED | OUTPATIENT
Start: 2024-05-09 | End: 2024-05-09 | Stop reason: SDUPTHER

## 2024-05-09 RX ORDER — SODIUM CHLORIDE 9 MG/ML
INJECTION, SOLUTION INTRAVENOUS PRN
Status: DISCONTINUED | OUTPATIENT
Start: 2024-05-09 | End: 2024-05-09 | Stop reason: HOSPADM

## 2024-05-09 RX ORDER — FENTANYL CITRATE 0.05 MG/ML
50 INJECTION, SOLUTION INTRAMUSCULAR; INTRAVENOUS EVERY 5 MIN PRN
Status: COMPLETED | OUTPATIENT
Start: 2024-05-09 | End: 2024-05-09

## 2024-05-09 RX ORDER — OXYCODONE HYDROCHLORIDE 5 MG/1
5 TABLET ORAL ONCE
Status: COMPLETED | OUTPATIENT
Start: 2024-05-09 | End: 2024-05-09

## 2024-05-09 RX ORDER — APREPITANT 40 MG/1
40 CAPSULE ORAL ONCE
Status: COMPLETED | OUTPATIENT
Start: 2024-05-09 | End: 2024-05-09

## 2024-05-09 RX ORDER — OXYCODONE HYDROCHLORIDE 5 MG/1
5-10 TABLET ORAL EVERY 6 HOURS PRN
Qty: 20 TABLET | Refills: 0 | Status: SHIPPED | OUTPATIENT
Start: 2024-05-09 | End: 2024-05-14

## 2024-05-09 RX ORDER — DOCUSATE SODIUM 100 MG/1
100 CAPSULE, LIQUID FILLED ORAL 2 TIMES DAILY PRN
Qty: 60 CAPSULE | Refills: 0 | Status: SHIPPED | OUTPATIENT
Start: 2024-05-09

## 2024-05-09 RX ORDER — ACETAMINOPHEN 500 MG
1000 TABLET ORAL 3 TIMES DAILY PRN
Qty: 120 TABLET | Refills: 0 | Status: SHIPPED | OUTPATIENT
Start: 2024-05-09

## 2024-05-09 RX ORDER — ONDANSETRON 2 MG/ML
4 INJECTION INTRAMUSCULAR; INTRAVENOUS
Status: COMPLETED | OUTPATIENT
Start: 2024-05-09 | End: 2024-05-09

## 2024-05-09 RX ORDER — GLYCOPYRROLATE 0.2 MG/ML
INJECTION INTRAMUSCULAR; INTRAVENOUS PRN
Status: DISCONTINUED | OUTPATIENT
Start: 2024-05-09 | End: 2024-05-09 | Stop reason: SDUPTHER

## 2024-05-09 RX ORDER — MAGNESIUM HYDROXIDE 1200 MG/15ML
LIQUID ORAL CONTINUOUS PRN
Status: DISCONTINUED | OUTPATIENT
Start: 2024-05-09 | End: 2024-05-09 | Stop reason: HOSPADM

## 2024-05-09 RX ORDER — FENTANYL CITRATE 50 UG/ML
INJECTION, SOLUTION INTRAMUSCULAR; INTRAVENOUS PRN
Status: DISCONTINUED | OUTPATIENT
Start: 2024-05-09 | End: 2024-05-09 | Stop reason: SDUPTHER

## 2024-05-09 RX ORDER — ONDANSETRON 2 MG/ML
4 INJECTION INTRAMUSCULAR; INTRAVENOUS ONCE
Status: COMPLETED | OUTPATIENT
Start: 2024-05-09 | End: 2024-05-09

## 2024-05-09 RX ORDER — SODIUM CHLORIDE 0.9 % (FLUSH) 0.9 %
5-40 SYRINGE (ML) INJECTION PRN
Status: DISCONTINUED | OUTPATIENT
Start: 2024-05-09 | End: 2024-05-09 | Stop reason: HOSPADM

## 2024-05-09 RX ORDER — OXYCODONE HYDROCHLORIDE AND ACETAMINOPHEN 5; 325 MG/1; MG/1
1 TABLET ORAL
Status: COMPLETED | OUTPATIENT
Start: 2024-05-09 | End: 2024-05-09

## 2024-05-09 RX ORDER — PHENYLEPHRINE HCL IN 0.9% NACL 1 MG/10 ML
SYRINGE (ML) INTRAVENOUS PRN
Status: DISCONTINUED | OUTPATIENT
Start: 2024-05-09 | End: 2024-05-09 | Stop reason: SDUPTHER

## 2024-05-09 RX ORDER — BUPIVACAINE HYDROCHLORIDE 5 MG/ML
INJECTION, SOLUTION EPIDURAL; INTRACAUDAL
Status: COMPLETED | OUTPATIENT
Start: 2024-05-09 | End: 2024-05-09

## 2024-05-09 RX ORDER — NALOXONE HYDROCHLORIDE 0.4 MG/ML
INJECTION, SOLUTION INTRAMUSCULAR; INTRAVENOUS; SUBCUTANEOUS PRN
Status: DISCONTINUED | OUTPATIENT
Start: 2024-05-09 | End: 2024-05-09 | Stop reason: HOSPADM

## 2024-05-09 RX ORDER — ROCURONIUM BROMIDE 10 MG/ML
INJECTION, SOLUTION INTRAVENOUS PRN
Status: DISCONTINUED | OUTPATIENT
Start: 2024-05-09 | End: 2024-05-09 | Stop reason: SDUPTHER

## 2024-05-09 RX ADMIN — NEOSTIGMINE 1 MG: 1 INJECTION INTRAVENOUS at 08:27

## 2024-05-09 RX ADMIN — GLYCOPYRROLATE 0.2 MG: 0.2 INJECTION, SOLUTION INTRAMUSCULAR; INTRAVENOUS at 08:28

## 2024-05-09 RX ADMIN — SUGAMMADEX 50 MG: 100 INJECTION, SOLUTION INTRAVENOUS at 09:05

## 2024-05-09 RX ADMIN — OXYCODONE HYDROCHLORIDE AND ACETAMINOPHEN 1 TABLET: 5; 325 TABLET ORAL at 10:26

## 2024-05-09 RX ADMIN — ROCURONIUM BROMIDE 100 MG: 10 SOLUTION INTRAVENOUS at 07:32

## 2024-05-09 RX ADMIN — ONDANSETRON 4 MG: 2 INJECTION INTRAMUSCULAR; INTRAVENOUS at 10:52

## 2024-05-09 RX ADMIN — GLYCOPYRROLATE 0.2 MG: 0.2 INJECTION, SOLUTION INTRAMUSCULAR; INTRAVENOUS at 08:26

## 2024-05-09 RX ADMIN — FENTANYL CITRATE 25 MCG: 50 INJECTION INTRAMUSCULAR; INTRAVENOUS at 08:44

## 2024-05-09 RX ADMIN — PROPOFOL 180 MG: 10 INJECTION, EMULSION INTRAVENOUS at 07:32

## 2024-05-09 RX ADMIN — HYDROMORPHONE HYDROCHLORIDE 0.25 MG: 1 INJECTION, SOLUTION INTRAMUSCULAR; INTRAVENOUS; SUBCUTANEOUS at 09:22

## 2024-05-09 RX ADMIN — DIPHENHYDRAMINE HYDROCHLORIDE 12.5 MG: 50 INJECTION, SOLUTION INTRAMUSCULAR; INTRAVENOUS at 09:56

## 2024-05-09 RX ADMIN — FENTANYL CITRATE 50 MCG: 0.05 INJECTION, SOLUTION INTRAMUSCULAR; INTRAVENOUS at 09:08

## 2024-05-09 RX ADMIN — NEOSTIGMINE 1 MG: 1 INJECTION INTRAVENOUS at 08:24

## 2024-05-09 RX ADMIN — OXYCODONE HYDROCHLORIDE 5 MG: 5 TABLET ORAL at 11:29

## 2024-05-09 RX ADMIN — FENTANYL CITRATE 50 MCG: 0.05 INJECTION, SOLUTION INTRAMUSCULAR; INTRAVENOUS at 09:14

## 2024-05-09 RX ADMIN — Medication 100 MCG: at 07:51

## 2024-05-09 RX ADMIN — SODIUM CHLORIDE: 9 INJECTION, SOLUTION INTRAVENOUS at 07:27

## 2024-05-09 RX ADMIN — GLYCOPYRROLATE 0.2 MG: 0.2 INJECTION, SOLUTION INTRAMUSCULAR; INTRAVENOUS at 08:27

## 2024-05-09 RX ADMIN — GLYCOPYRROLATE 0.2 MG: 0.2 INJECTION, SOLUTION INTRAMUSCULAR; INTRAVENOUS at 08:23

## 2024-05-09 RX ADMIN — FENTANYL CITRATE 25 MCG: 50 INJECTION INTRAMUSCULAR; INTRAVENOUS at 07:54

## 2024-05-09 RX ADMIN — PROPOFOL 50 MCG/KG/MIN: 10 INJECTION, EMULSION INTRAVENOUS at 07:35

## 2024-05-09 RX ADMIN — FENTANYL CITRATE 50 MCG: 0.05 INJECTION, SOLUTION INTRAMUSCULAR; INTRAVENOUS at 09:32

## 2024-05-09 RX ADMIN — Medication 100 MCG: at 07:46

## 2024-05-09 RX ADMIN — HYDROMORPHONE HYDROCHLORIDE 0.25 MG: 1 INJECTION, SOLUTION INTRAMUSCULAR; INTRAVENOUS; SUBCUTANEOUS at 09:27

## 2024-05-09 RX ADMIN — FENTANYL CITRATE 50 MCG: 0.05 INJECTION, SOLUTION INTRAMUSCULAR; INTRAVENOUS at 09:48

## 2024-05-09 RX ADMIN — SODIUM CHLORIDE, PRESERVATIVE FREE 20 MG: 5 INJECTION INTRAVENOUS at 07:22

## 2024-05-09 RX ADMIN — FENTANYL CITRATE 50 MCG: 50 INJECTION INTRAMUSCULAR; INTRAVENOUS at 07:32

## 2024-05-09 RX ADMIN — Medication 100 MCG: at 08:12

## 2024-05-09 RX ADMIN — ONDANSETRON 4 MG: 2 INJECTION INTRAMUSCULAR; INTRAVENOUS at 09:10

## 2024-05-09 RX ADMIN — NEOSTIGMINE 1 MG: 1 INJECTION INTRAVENOUS at 08:26

## 2024-05-09 RX ADMIN — NEOSTIGMINE 1 MG: 1 INJECTION INTRAVENOUS at 08:28

## 2024-05-09 RX ADMIN — APREPITANT 40 MG: 40 CAPSULE ORAL at 07:22

## 2024-05-09 RX ADMIN — NEOSTIGMINE 1 MG: 1 INJECTION INTRAVENOUS at 08:35

## 2024-05-09 RX ADMIN — SODIUM CHLORIDE 2000 MG: 900 INJECTION INTRAVENOUS at 07:34

## 2024-05-09 RX ADMIN — GLYCOPYRROLATE 0.2 MG: 0.2 INJECTION, SOLUTION INTRAMUSCULAR; INTRAVENOUS at 08:35

## 2024-05-09 ASSESSMENT — PAIN SCALES - GENERAL
PAINLEVEL_OUTOF10: 6
PAINLEVEL_OUTOF10: 7
PAINLEVEL_OUTOF10: 6
PAINLEVEL_OUTOF10: 6
PAINLEVEL_OUTOF10: 4
PAINLEVEL_OUTOF10: 5
PAINLEVEL_OUTOF10: 6
PAINLEVEL_OUTOF10: 7
PAINLEVEL_OUTOF10: 8
PAINLEVEL_OUTOF10: 9
PAINLEVEL_OUTOF10: 6

## 2024-05-09 ASSESSMENT — PAIN - FUNCTIONAL ASSESSMENT
PAIN_FUNCTIONAL_ASSESSMENT: ADULT NONVERBAL PAIN SCALE (NPVS)
PAIN_FUNCTIONAL_ASSESSMENT: PREVENTS OR INTERFERES SOME ACTIVE ACTIVITIES AND ADLS
PAIN_FUNCTIONAL_ASSESSMENT: PREVENTS OR INTERFERES SOME ACTIVE ACTIVITIES AND ADLS

## 2024-05-09 ASSESSMENT — PAIN DESCRIPTION - ONSET
ONSET: ON-GOING
ONSET: ON-GOING

## 2024-05-09 ASSESSMENT — LIFESTYLE VARIABLES: SMOKING_STATUS: 0

## 2024-05-09 ASSESSMENT — PAIN DESCRIPTION - PAIN TYPE
TYPE: SURGICAL PAIN
TYPE: SURGICAL PAIN

## 2024-05-09 ASSESSMENT — PAIN DESCRIPTION - LOCATION
LOCATION: ABDOMEN

## 2024-05-09 ASSESSMENT — PAIN DESCRIPTION - DESCRIPTORS
DESCRIPTORS: DISCOMFORT
DESCRIPTORS: ACHING;DISCOMFORT
DESCRIPTORS: ACHING;DISCOMFORT

## 2024-05-09 ASSESSMENT — PAIN DESCRIPTION - ORIENTATION
ORIENTATION: RIGHT

## 2024-05-09 ASSESSMENT — PAIN DESCRIPTION - FREQUENCY
FREQUENCY: INTERMITTENT
FREQUENCY: CONTINUOUS

## 2024-05-09 ASSESSMENT — ENCOUNTER SYMPTOMS: SHORTNESS OF BREATH: 0

## 2024-05-09 NOTE — ANESTHESIA PRE PROCEDURE
Smoking status: Former    Smokeless tobacco: Never   Substance Use Topics    Alcohol use: Yes     Comment: social                                Counseling given: Not Answered      Vital Signs (Current):   Vitals:    05/06/24 1408 05/09/24 0619 05/09/24 0624   BP:   104/65   Pulse:   77   Resp:   18   Temp:   98 °F (36.7 °C)   TempSrc:   Oral   SpO2:   99%   Weight: 86.2 kg (190 lb) 86 kg (189 lb 9.5 oz)    Height: 1.702 m (5' 7\") 1.702 m (5' 7\")                                               BP Readings from Last 3 Encounters:   05/09/24 104/65   05/06/24 102/68   04/25/24 (!) 105/59       NPO Status: Time of last liquid consumption: 2300                        Time of last solid consumption: 2300                        Date of last liquid consumption: 05/08/24                        Date of last solid food consumption: 05/08/24    BMI:   Wt Readings from Last 3 Encounters:   05/09/24 86 kg (189 lb 9.5 oz)   04/30/24 85.7 kg (189 lb)   04/25/24 86.1 kg (189 lb 13.1 oz)     Body mass index is 29.69 kg/m².    CBC:   Lab Results   Component Value Date/Time    WBC 8.9 04/25/2024 06:30 AM    RBC 3.98 04/25/2024 06:30 AM    HGB 11.6 04/25/2024 06:30 AM    HCT 33.3 04/25/2024 06:30 AM    MCV 83.5 04/25/2024 06:30 AM    RDW 14.3 04/25/2024 06:30 AM     04/25/2024 06:30 AM       CMP:   Lab Results   Component Value Date/Time     04/26/2016 07:15 PM    K 3.9 04/26/2016 07:15 PM    CL 97 04/26/2016 07:15 PM    CO2 22 04/26/2016 07:15 PM    BUN 8 04/26/2016 07:15 PM    CREATININE 0.7 04/26/2016 07:15 PM    GFRAA >60 04/26/2016 07:15 PM    AGRATIO 1.2 04/26/2016 07:15 PM    LABGLOM >60 04/26/2016 07:15 PM    GLUCOSE 145 04/26/2016 07:15 PM    CALCIUM 9.5 04/26/2016 07:15 PM    BILITOT 0.6 04/25/2024 06:30 AM    ALKPHOS 69 04/25/2024 06:30 AM    AST 75 04/25/2024 06:30 AM    ALT 51 04/25/2024 06:30 AM       POC Tests: No results for input(s): \"POCGLU\", \"POCNA\", \"POCK\", \"POCCL\", \"POCBUN\", \"POCHEMO\", \"POCHCT\" in the

## 2024-05-09 NOTE — ANESTHESIA POSTPROCEDURE EVALUATION
Department of Anesthesiology  Postprocedure Note    Patient: Trace Argueta  MRN: [id number]  YOB: 1995  Date of evaluation: 5/9/2024    Procedure Summary       Date: 05/09/24 Room / Location: [address]    Anesthesia Start: 0727 Anesthesia Stop: 0902    Procedure: LAPAROSCOPIC CHOLECYSTECTOMY WITH CHOLANGIOGRAM (Abdomen) Diagnosis:       Symptomatic cholelithiasis      (Symptomatic cholelithiasis [K80.20])    Surgeons: Earl Dover MD Responsible Provider: Wil Meyers MD    Anesthesia Type: general ASA Status: 2            Anesthesia Type: No value filed.    Yasir Phase I: Yasir Score: 10    Yasir Phase II: Yasir Score: 10    Anesthesia Post Evaluation    Patient location during evaluation: bedside  Patient participation: complete - patient participated  Level of consciousness: awake and alert  Pain score: 4  Nausea & Vomiting: no nausea  Cardiovascular status: hemodynamically stable  Respiratory status: acceptable  Hydration status: stable  Pain management: adequate    No notable events documented.

## 2024-05-09 NOTE — PROGRESS NOTES
Pt still complaining of 6/10 pain, states PO pain medication has not given any relief. Dr. Meyers notified and 5mg oxycodone ordered and given. Call to OB floor to inquire if there are any contraindications to Pt wearing an abdominal binder, Dr. Nugent states okay as long as it is not put on too tight.

## 2024-05-09 NOTE — PROGRESS NOTES
Oral airway dc'd. VSS on 4L NC. Surgical dressing clean, dry and intact. OB RN at bedside to do fetal heart tones.

## 2024-05-09 NOTE — PROGRESS NOTES
Pt states that nausea and pain have improved at this time, Pt agreeable to getting changed and going home. Discharge instructions given to Pt and spouse, all questions answered. Pt discharged to home with spouse to transport.

## 2024-05-09 NOTE — OP NOTE
were withdrawn and the skin closed with 4-0 Vicryl.  Dermabond was applied after injecting local anesthetic.  The patient was awoken and extubated without complication. All instrument counts were said to be correct.  Fetal heart tones to be obtained in the recovery area.        Findings: gallstones within gallbladder, no acute inflammation or adhesions, normal cholangiogram    Estimated Blood Loss: less than 50 ml    Complications: none           Specimen: gallbladder and contents                  Electronically signed by Earl Dover MD on 5/9/2024 at 8:34 AM

## 2024-05-09 NOTE — DISCHARGE INSTRUCTIONS
Earl Dover MD     General and Vascular Surgery   Shukri Mccormick MD     [address]   Mariano Leon MD     [address]  [place] [address]        Phone: [phone]           Fax: [phone]                                                         POST-OPERATIVE INSTRUCTIONS FOR LAPAROSCOPIC CHOLECYSTECTOMY    Call the office to schedule your post-operative appointment with your surgeon for two (2) weeks.     You will have water occlusive glue closing your incisions.    You may shower.  Wash incisions gently, and pat them dry. Do not rub your incisions.  Do not soak incisions in tub baths, hot tubs or pools    General guidelines for activity:  Avoid strenuous activity or lifting anything heavier than 15 pounds for 4-6 weeks.   It is OK to be up walking around; walking up and down stairs is also OK.   Do what is comfortable: stop and rest when you feel tired.     Drink plenty of fluids and stay on a bland diet for 2-3 days after surgery.     Do NOT drive for one week and while taking your narcotic pain medicine.     Watch for signs of infection:   Fever over 100.5°   Excessive warmth or bright redness around your incisions  Leakage of bloody or cloudy fluid from your incisions    During the laparoscopic procedure that you had, gas is pumped into the abdominal cavity.  You may feel abdominal, shoulder, or rib pain for a few days due to this.    You will have pain medicine ordered. Take as directed.    If you experience constipation  Increase your water intake, and activity; walking is best.  A stool softener or mild laxative may be necessary if you still have not had a bowel  movement ; call the office for further instructions.

## 2024-05-09 NOTE — PROGRESS NOTES
Notified Dr. Meyers about Pt's persistent nausea. Unsure if there is another medication that can be safely given to Pt r/t pregnancy and Pt has already received zofran. Pt has not had any emesis, just feels nauseated. Dr. Meyers wants to give Pt fluid blous to see if this improve nausea, will continue to monitor.

## 2024-05-09 NOTE — H&P
Update History & Physical    The patient's History and Physical from my office on May 9, 2024 was reviewed with the patient and I examined the patient. There was no change. The surgical site was confirmed by the patient and me.  FHT obtained in preop.       Plan: The risks, benefits, expected outcome, and alternative to the recommended procedure have been discussed with the patient. Patient understands and wants to proceed with the procedure.  Will proceed with laparoscopic cholecystectomy with cholangiogram, possible open.  Ancef ordered.  Bilateral SCDs.  Will plan to repeat FHT in PACU.    Electronically signed by Earl Dover MD on 5/9/2024 at 7:13 AM

## 2024-05-13 ENCOUNTER — TELEPHONE (OUTPATIENT)
Dept: VASCULAR SURGERY | Age: 29
End: 2024-05-13

## 2024-05-13 DIAGNOSIS — Z90.49 S/P LAPAROSCOPIC CHOLECYSTECTOMY: ICD-10-CM

## 2024-05-13 DIAGNOSIS — K80.20 SYMPTOMATIC CHOLELITHIASIS: Primary | ICD-10-CM

## 2024-05-13 RX ORDER — HYDROCODONE BITARTRATE AND ACETAMINOPHEN 5; 325 MG/1; MG/1
1 TABLET ORAL EVERY 6 HOURS PRN
Qty: 20 TABLET | Refills: 0 | Status: SHIPPED | OUTPATIENT
Start: 2024-05-13 | End: 2024-05-18

## 2024-05-14 ENCOUNTER — TELEPHONE (OUTPATIENT)
Dept: VASCULAR SURGERY | Age: 29
End: 2024-05-14

## 2024-05-16 NOTE — TELEPHONE ENCOUNTER
Spoke with patient. She is still feeling sharp pains here and there. Advised her this is normal. The deeper stitches cause this. Its a part of the healing process.  She will call with any more questions/concerns

## 2024-05-23 ENCOUNTER — OFFICE VISIT (OUTPATIENT)
Dept: SURGERY | Age: 29
End: 2024-05-23

## 2024-05-23 VITALS — DIASTOLIC BLOOD PRESSURE: 67 MMHG | HEART RATE: 94 BPM | SYSTOLIC BLOOD PRESSURE: 100 MMHG

## 2024-05-23 DIAGNOSIS — Z3A.25 25 WEEKS GESTATION OF PREGNANCY: ICD-10-CM

## 2024-05-23 DIAGNOSIS — K80.20 SYMPTOMATIC CHOLELITHIASIS: Primary | ICD-10-CM

## 2024-05-23 DIAGNOSIS — Z90.49 S/P LAPAROSCOPIC CHOLECYSTECTOMY: ICD-10-CM

## 2024-05-23 PROCEDURE — 99024 POSTOP FOLLOW-UP VISIT: CPT | Performed by: SURGERY

## 2024-05-23 NOTE — PROGRESS NOTES
Post Operative Visit    [facility]  [facility]   Earl Dover MD    [address], [address]  [place], [place] [address]  Phone: [phone]  Fax: [phone]    Trace Argueta   YOB: 1995    Date of Visit:  5/23/2024    No ref. provider found  Isamar Nugent MD    HPI:     Gallbladder: Trace Argueta is 28 y.o. female presenting for her 2 week follow up visit after laparoscopic cholecystectomy, which was performed for symptomatic cholelithiasis.   Overall her pain has improved.  She is eating and drinking without difficulty.  BMs are normal.  No jaundice.  No fevers or chills.         Vitals:    05/23/24 1436   BP: 100/67   Pulse: 94     There is no height or weight on file to calculate BMI.       PHYSICAL EXAM:    CONSTITUTIONAL:  alert, no apparent distress  LUNGS:  Resp easy and unlabored  ABDOMEN:  Incisions healing well, no erythema or induration, soft, non-distended, non-tender, voluntary guarding absent, and hernia absent  MUSCULOSKELETAL: No edema  NEUROLOGIC:  Mental Status Exam:  Level of Alertness:   awake  Orientation:   person, place, time        Pathology:  FINAL DIAGNOSIS:     Gallbladder, cholecystectomy:   - Chronic cholecystitis with cholelithiasis.   - One benign lymph node (0/1).    BUCCA/BUCCA     ASSESSMENT:     Diagnosis Orders   1. Symptomatic cholelithiasis        2. S/P laparoscopic cholecystectomy        3. 23 weeks gestation of pregnancy              PLAN:    Overall Trace Argueta is doing well.  Her pregnancy is moving forward without issues.  Abdominal pain has improved. She is tolerating a regular diet with normal bowel function.  Incisions are healing well.   Continue lifting restrictions for 2 more weeks.  Will have her follow up prn.      Electronically signed by Earl Dover MD on 5/23/2024

## 2024-06-03 LAB — T. PALLIDUM (SYPHILIS) ANTIBODY, EXTERNAL RESULT: NORMAL

## 2024-08-07 LAB
C. TRACHOMATIS, EXTERNAL RESULT: NORMAL
GBS, EXTERNAL RESULT: NORMAL
N. GONORRHOEAE, EXTERNAL RESULT: NORMAL

## 2024-08-14 ENCOUNTER — HOSPITAL ENCOUNTER (OUTPATIENT)
Age: 29
Discharge: HOME | End: 2024-08-14
Attending: OBSTETRICS & GYNECOLOGY | Admitting: OBSTETRICS & GYNECOLOGY
Payer: COMMERCIAL

## 2024-08-14 VITALS
DIASTOLIC BLOOD PRESSURE: 74 MMHG | HEIGHT: 67 IN | SYSTOLIC BLOOD PRESSURE: 111 MMHG | BODY MASS INDEX: 31.39 KG/M2 | WEIGHT: 200 LBS | RESPIRATION RATE: 18 BRPM | HEART RATE: 84 BPM

## 2024-08-14 PROCEDURE — 99212 OFFICE O/P EST SF 10 MIN: CPT

## 2024-08-14 PROCEDURE — 59025 FETAL NON-STRESS TEST: CPT

## 2024-08-14 NOTE — FLOWSHEET NOTE
Pt arrived to triage. Pt complain of elevated BP at home today, HA, tylenol taken at 1200.  Pt reports fetal movement. Pt placed on EFM. Pt oriented to room and call light. Pt denies any questions at this time. Call light in reach. /74, Dr. Nugent notified.

## 2024-08-14 NOTE — FLOWSHEET NOTE
08/14/24 1529   Findings   Baseline 125 BPM   Baseline Classification Normal   Variability 6-25 BPM   Decelerations   (VD x1 noted per MD and pt)   Accelerations Yes   Acoustic Stimulator No   Fetal Movement Yes   Multiple Gestation? No   Uterine contractions/10 min. 0   Interpretation Reactive   Interpreted by [facility]/Dr. Nugent   $Non Stress Test Charge  Yes

## 2024-08-14 NOTE — FORENSIC NURSE
Pt discharged to home ambulatory accompanied by .  Patient verbalizes understanding of discharge instructions and denies further questions.  Patient discharged in stable condition at 1528.

## 2024-08-14 NOTE — PROGRESS NOTES
Department of Obstetrics and Gynecology  Triage Evaluation Note    SUBJECTIVE:  Trace Argueta is a 28 y.o.,  at 37w2d who presents for HA and elevated BP at home. She has no prior hx of elevated BP. She denies vaginal bleeding, leakage of fluid, or contractions. She states the baby is moving well. She denies fever, shortness of breath, palpitations, nausea, vomiting, diarrhea on ROS.    I personally reviewed the past medical and surgical histories, as well as the problem list.    OBJECTIVE:  Vital Signs: /85   Pulse (!) 111   Resp 18   Ht 1.702 m (5' 7\")   Wt 90.7 kg (200 lb)   BMI 31.32 kg/m²   Appearance/Psychiatric: alert and oriented X3  Constitutional: Appears well, no distress  Cardiovascular: She does not have edema.  Respiratory:Respiratory effort is normal.  Gastrointestinal: soft, non tender, Gravid. The uterine size is equal to dates.    Pelvic: Cervix deferred.   NST read: 125, moderate, positive accels, no decels  [place]:  quiet      ASSESSMENT AND PLAN:  28 y.o.  at 37w2d with elevated BP at home   Bps all normal in triage  Cat I FHTs    After completion of the Medical Screening Evaluation, it has been determined that a medical emergency does not exist and the patient is not in active labor, and therefore the patient may be discharged home.    The patient will follow up in 1 weeks. She was counseled to call or return for vaginal bleeding, regular contractions, leakage of fluid or decreased fetal movement.    Electronically signed by Isamar Nugent MD on 2024 at 3:26 PM

## 2024-08-14 NOTE — DISCHARGE INSTRUCTIONS
Home Undelivered Discharge Instructions    After Discharge Orders:  .         Diet:  normal diet as tolerated    Rest: normal activity as tolerated    Other instructions: Do kick counts once a day on your baby. Choose the time of day your baby is most active. Get in a comfortable lying or sitting position and time how long it takes to feel 10 kicks, twists, turns, swishes, or rolls. Call your physician or midwife if there have not been 10 kicks in 1 hours    Call physician or midwife, return to Labor and Delivery, call 911, or go to the nearest Emergency Room if: increased leakage or fluid, contractions more than  5 per  1 hour, decreased fetal movement, persistent low back pain or cramping, bleeding from vaginal area, difficulty urinating, pain with urination, difficulty breathing, new calf pain, persistent headache, vision change, or any other concerns.

## 2024-08-14 NOTE — FLOWSHEET NOTE
Dr. Nugent at bedside. After completion of the Medical Screening Evaluation, it has been determined that a medical emergency does not exist and the patient is not in active labor, and therefore the patient may be discharged home.

## 2024-08-22 ENCOUNTER — HOSPITAL ENCOUNTER (OUTPATIENT)
Age: 29
Discharge: HOME | End: 2024-08-22
Attending: OBSTETRICS & GYNECOLOGY | Admitting: OBSTETRICS & GYNECOLOGY
Payer: COMMERCIAL

## 2024-08-22 VITALS
HEART RATE: 76 BPM | RESPIRATION RATE: 18 BRPM | TEMPERATURE: 98.6 F | SYSTOLIC BLOOD PRESSURE: 113 MMHG | DIASTOLIC BLOOD PRESSURE: 72 MMHG

## 2024-08-22 PROCEDURE — 59025 FETAL NON-STRESS TEST: CPT

## 2024-08-22 PROCEDURE — 99211 OFF/OP EST MAY X REQ PHY/QHP: CPT

## 2024-08-22 RX ORDER — URSODIOL 500 MG/1
500 TABLET, FILM COATED ORAL 3 TIMES DAILY
Qty: 30 TABLET | Refills: 0 | Status: ON HOLD | OUTPATIENT
Start: 2024-08-22

## 2024-08-22 NOTE — PROGRESS NOTES
Department of Obstetrics and Gynecology  Fetal surveillance testing summary    INDICATIONS:  decreased fetal movement    OBJECTIVE RESULTS:  Time of the test: 10:10am - 10:50am  130, moderate, positive accels, no decels  Cat I FHTs  Irregular ctxs    Fetal surveillance: reassuring     Electronically signed by Isamar Nugent MD on 8/22/2024 at 11:17 AM

## 2024-08-22 NOTE — DISCHARGE INSTRUCTIONS
General Surgery Daily Progress Note    Patient: Timothy Brunner MRN: [id number]  SSN: [id number]    YOB: 1951  Age: 80 y.o.   Sex: male      Admit Date: 5/3/2022    POD 4 Day Post-Op    Procedure: Procedure(s):  LAPAROSCOPIC LOW ANTERIOR RESECTION WITH COLOSTOMY    Subjective:   Pain Controlled  Tolerating gi lite- no N/V  Up and OOB with mobility   Seen by pulm and cards today for recs of known COPD and Afib    Current Facility-Administered Medications   Medication Dose Route Frequency    apixaban (ELIQUIS) tablet 5 mg  5 mg Oral BID    methylPREDNISolone (PF) (SOLU-MEDROL) injection 40 mg  40 mg IntraVENous Q8H    benzonatate (TESSALON) capsule 200 mg  200 mg Oral TID PRN    ipratropium (ATROVENT) 0.02 % nebulizer solution 0.5 mg  0.5 mg Nebulization Q6HWA RT    And    arformoteroL (BROVANA) neb solution 15 mcg  15 mcg Nebulization BID RT    dilTIAZem ER (CARDIZEM CD) capsule 240 mg  240 mg Oral DAILY    famotidine (PEPCID) tablet 20 mg  20 mg Oral BID    sodium chloride (NS) flush 5-40 mL  5-40 mL IntraVENous PRN    naloxone (NARCAN) injection 0.04 mg  0.04 mg IntraVENous Multiple    albuterol (PROVENTIL VENTOLIN) nebulizer solution 2.5 mg  2.5 mg Nebulization Q4H PRN    budesonide (PULMICORT) 500 mcg/2 ml nebulizer suspension  500 mcg Nebulization BID RT    montelukast (SINGULAIR) tablet 10 mg  10 mg Oral QHS    sodium chloride (NS) flush 5-40 mL  5-40 mL IntraVENous Q8H    sodium chloride (NS) flush 5-40 mL  5-40 mL IntraVENous PRN    promethazine (PHENERGAN) tablet 12.5 mg  12.5 mg Oral Q6H PRN    Or    ondansetron (ZOFRAN) injection 4 mg  4 mg IntraVENous Q6H PRN    naloxegoL (MOVANTIK) tablet 25 mg  25 mg Oral ACB    acetaminophen (TYLENOL) tablet 1,000 mg  1,000 mg Oral Q6H    oxyCODONE IR (ROXICODONE) tablet 5 mg  5 mg Oral Q4H PRN    Or    oxyCODONE IR (ROXICODONE) tablet 10 mg  10 mg Oral Q4H PRN    guaiFENesin ER (MUCINEX) tablet 1,200 mg  1,200 mg Oral Q12H Home Undelivered Discharge Instructions    After Discharge Orders:                Diet:  normal diet as tolerated    Rest: normal activity as tolerated    Other instructions: Do kick counts once a day on your baby. Choose the time of day your baby is most active. Get in a comfortable lying or sitting position and time how long it takes to feel 10 kicks, twists, turns, swishes, or rolls. Call your physician or midwife if there have not been 10 kicks in 1 hours    Call physician or midwife, return to Labor and Delivery, call 911, or go to the nearest Emergency Room if: increased leakage or fluid, contractions more than  5 per  1 hour, decreased fetal movement, persistent low back pain or cramping, bleeding from vaginal area, difficulty urinating, pain with urination, difficulty breathing, new calf pain, persistent headache, vision change, or increased itching, or any other concerns.            Objective:   05/07 0701 - 05/07 1900  In: -   Out: 573 [HXIPM:414]  05/05 1901 - 05/07 0700  In: -   Out: 5650 [Urine:5025; Drains:90]  Patient Vitals for the past 8 hrs:   BP Temp Pulse Resp SpO2   05/07/22 0732 110/77 97.6 °F (36.4 °C) (!) 104 18 95 %   05/07/22 0549 118/86 97.7 °F (36.5 °C) 99 18 93 %       Physical Exam:  General: Alert, cooperative, NAD  Lungs: 3.5L NC mid 90s without evidence of WOB  Heart: On monitor- Afib- low 100s  Abdomen: Soft, mild tenderness as expected, non-distended. Incisions c/d/i.  ERIC drain serosang. Ostomy with no current output in bag but patient reports a lot of gas yesterday  Extremities: Warm, moves all, no edema  Skin:  Warm and dry, no rash    Labs:   Recent Labs     05/05/22 0447   WBC 12.0*   HGB 10.6*   HCT 31.8*        Recent Labs     05/05/22 0447   *   K 4.6   CL 97   CO2 37*   *   BUN 15   CREA 0.54*   CA 9.0   MG 2.3   PHOS 2.6       Assessment / Plan:       Procedure: Procedure(s):  LAPAROSCOPIC LOW ANTERIOR RESECTION WITH COLOSTOMY      Cont current pain regimen  GI lite diet- add own supplements BID (at bedside)  Cards - signed off - resume eliquis today for afib  Pulm- signed off.  On solumedrol, pulmicort, atrovent, brovana, and singulair; maintain O2 sats >88;  Baseline 2.5L  Cont Incentive spirometer  Must mobilize TID and into chair TID        Stephen Ziegler MD

## 2024-08-26 ENCOUNTER — HOSPITAL ENCOUNTER (INPATIENT)
Age: 29
LOS: 2 days | Discharge: HOME | End: 2024-08-28
Attending: OBSTETRICS & GYNECOLOGY | Admitting: OBSTETRICS & GYNECOLOGY
Payer: COMMERCIAL

## 2024-08-26 ENCOUNTER — APPOINTMENT (OUTPATIENT)
Dept: LABOR AND DELIVERY | Age: 29
End: 2024-08-26
Payer: COMMERCIAL

## 2024-08-26 ENCOUNTER — ANESTHESIA (OUTPATIENT)
Dept: LABOR AND DELIVERY | Age: 29
End: 2024-08-26
Payer: COMMERCIAL

## 2024-08-26 ENCOUNTER — ANESTHESIA EVENT (OUTPATIENT)
Dept: LABOR AND DELIVERY | Age: 29
End: 2024-08-26
Payer: COMMERCIAL

## 2024-08-26 PROBLEM — Z34.90 ENCOUNTER FOR INDUCTION OF LABOR: Status: ACTIVE | Noted: 2024-08-26

## 2024-08-26 LAB
ABO + RH BLD: NORMAL
AMPHETAMINES UR QL SCN>1000 NG/ML: NORMAL
BARBITURATES UR QL SCN>200 NG/ML: NORMAL
BENZODIAZ UR QL SCN>200 NG/ML: NORMAL
BLD GP AB SCN SERPL QL: NORMAL
BUPRENORPHINE+NOR UR QL SCN: NORMAL
CANNABINOIDS UR QL SCN>50 NG/ML: NORMAL
COCAINE UR QL SCN: NORMAL
DEPRECATED RDW RBC AUTO: 16 % (ref 12.4–15.4)
DRUG SCREEN COMMENT UR-IMP: NORMAL
FENTANYL SCREEN, URINE: NORMAL
HCT VFR BLD AUTO: 32.4 % (ref 36–48)
HGB BLD-MCNC: 11.4 G/DL (ref 12–16)
MCH RBC QN AUTO: 26.7 PG (ref 26–34)
MCHC RBC AUTO-ENTMCNC: 35.3 G/DL (ref 31–36)
MCV RBC AUTO: 75.7 FL (ref 80–100)
METHADONE UR QL SCN>300 NG/ML: NORMAL
OPIATES UR QL SCN>300 NG/ML: NORMAL
OXYCODONE UR QL SCN: NORMAL
PCP UR QL SCN>25 NG/ML: NORMAL
PH UR STRIP: 6 [PH]
PLATELET # BLD AUTO: 223 K/UL (ref 135–450)
PMV BLD AUTO: 8.9 FL (ref 5–10.5)
RBC # BLD AUTO: 4.27 M/UL (ref 4–5.2)
REAGIN+T PALLIDUM IGG+IGM SERPL-IMP: NORMAL
WBC # BLD AUTO: 7.7 K/UL (ref 4–11)

## 2024-08-26 PROCEDURE — 6360000002 HC RX W HCPCS: Performed by: NURSE ANESTHETIST, CERTIFIED REGISTERED

## 2024-08-26 PROCEDURE — 86850 RBC ANTIBODY SCREEN: CPT

## 2024-08-26 PROCEDURE — 1220000000 HC SEMI PRIVATE OB R&B

## 2024-08-26 PROCEDURE — 59025 FETAL NON-STRESS TEST: CPT

## 2024-08-26 PROCEDURE — 2500000003 HC RX 250 WO HCPCS

## 2024-08-26 PROCEDURE — 51701 INSERT BLADDER CATHETER: CPT

## 2024-08-26 PROCEDURE — 86901 BLOOD TYPING SEROLOGIC RH(D): CPT

## 2024-08-26 PROCEDURE — 85027 COMPLETE CBC AUTOMATED: CPT

## 2024-08-26 PROCEDURE — 2580000003 HC RX 258: Performed by: OBSTETRICS & GYNECOLOGY

## 2024-08-26 PROCEDURE — 6360000002 HC RX W HCPCS: Performed by: ANESTHESIOLOGY

## 2024-08-26 PROCEDURE — 86780 TREPONEMA PALLIDUM: CPT

## 2024-08-26 PROCEDURE — 2580000003 HC RX 258: Performed by: NURSE ANESTHETIST, CERTIFIED REGISTERED

## 2024-08-26 PROCEDURE — 2500000003 HC RX 250 WO HCPCS: Performed by: OBSTETRICS & GYNECOLOGY

## 2024-08-26 PROCEDURE — 3700000025 EPIDURAL BLOCK: Performed by: ANESTHESIOLOGY

## 2024-08-26 PROCEDURE — 80307 DRUG TEST PRSMV CHEM ANLYZR: CPT

## 2024-08-26 PROCEDURE — 86900 BLOOD TYPING SEROLOGIC ABO: CPT

## 2024-08-26 PROCEDURE — 6370000000 HC RX 637 (ALT 250 FOR IP): Performed by: OBSTETRICS & GYNECOLOGY

## 2024-08-26 PROCEDURE — 6360000002 HC RX W HCPCS: Performed by: OBSTETRICS & GYNECOLOGY

## 2024-08-26 RX ORDER — MISOPROSTOL 200 UG/1
400 TABLET ORAL PRN
Status: DISCONTINUED | OUTPATIENT
Start: 2024-08-26 | End: 2024-08-27

## 2024-08-26 RX ORDER — SODIUM CHLORIDE, SODIUM LACTATE, POTASSIUM CHLORIDE, CALCIUM CHLORIDE 600; 310; 30; 20 MG/100ML; MG/100ML; MG/100ML; MG/100ML
INJECTION, SOLUTION INTRAVENOUS CONTINUOUS PRN
Status: DISCONTINUED | OUTPATIENT
Start: 2024-08-26 | End: 2024-08-27 | Stop reason: SDUPTHER

## 2024-08-26 RX ORDER — TERBUTALINE SULFATE 1 MG/ML
0.25 INJECTION, SOLUTION SUBCUTANEOUS
Status: DISCONTINUED | OUTPATIENT
Start: 2024-08-26 | End: 2024-08-27

## 2024-08-26 RX ORDER — CARBOPROST TROMETHAMINE 250 UG/ML
250 INJECTION, SOLUTION INTRAMUSCULAR PRN
Status: DISCONTINUED | OUTPATIENT
Start: 2024-08-26 | End: 2024-08-27

## 2024-08-26 RX ORDER — LIDOCAINE HCL/EPINEPHRINE/PF 2%-1:200K
VIAL (ML) INJECTION PRN
Status: DISCONTINUED | OUTPATIENT
Start: 2024-08-26 | End: 2024-08-27 | Stop reason: SDUPTHER

## 2024-08-26 RX ORDER — SODIUM CHLORIDE, SODIUM LACTATE, POTASSIUM CHLORIDE, AND CALCIUM CHLORIDE .6; .31; .03; .02 G/100ML; G/100ML; G/100ML; G/100ML
500 INJECTION, SOLUTION INTRAVENOUS PRN
Status: DISCONTINUED | OUTPATIENT
Start: 2024-08-26 | End: 2024-08-27

## 2024-08-26 RX ORDER — METHYLERGONOVINE MALEATE 0.2 MG/ML
200 INJECTION INTRAVENOUS PRN
Status: DISCONTINUED | OUTPATIENT
Start: 2024-08-26 | End: 2024-08-27

## 2024-08-26 RX ORDER — TRANEXAMIC ACID 10 MG/ML
1000 INJECTION, SOLUTION INTRAVENOUS
Status: DISCONTINUED | OUTPATIENT
Start: 2024-08-26 | End: 2024-08-27

## 2024-08-26 RX ORDER — ONDANSETRON 4 MG/1
4 TABLET, ORALLY DISINTEGRATING ORAL EVERY 6 HOURS PRN
Status: DISCONTINUED | OUTPATIENT
Start: 2024-08-26 | End: 2024-08-26 | Stop reason: SDUPTHER

## 2024-08-26 RX ORDER — SODIUM CHLORIDE 9 MG/ML
25 INJECTION, SOLUTION INTRAVENOUS PRN
Status: DISCONTINUED | OUTPATIENT
Start: 2024-08-26 | End: 2024-08-27

## 2024-08-26 RX ORDER — SODIUM CHLORIDE, SODIUM LACTATE, POTASSIUM CHLORIDE, CALCIUM CHLORIDE 600; 310; 30; 20 MG/100ML; MG/100ML; MG/100ML; MG/100ML
INJECTION, SOLUTION INTRAVENOUS CONTINUOUS
Status: DISCONTINUED | OUTPATIENT
Start: 2024-08-26 | End: 2024-08-27

## 2024-08-26 RX ORDER — SODIUM CHLORIDE 0.9 % (FLUSH) 0.9 %
5-40 SYRINGE (ML) INJECTION EVERY 12 HOURS SCHEDULED
Status: DISCONTINUED | OUTPATIENT
Start: 2024-08-26 | End: 2024-08-27

## 2024-08-26 RX ORDER — BUPIVACAINE HYDROCHLORIDE 2.5 MG/ML
INJECTION, SOLUTION EPIDURAL; INFILTRATION; INTRACAUDAL PRN
Status: DISCONTINUED | OUTPATIENT
Start: 2024-08-26 | End: 2024-08-27 | Stop reason: SDUPTHER

## 2024-08-26 RX ORDER — ONDANSETRON 2 MG/ML
4 INJECTION INTRAMUSCULAR; INTRAVENOUS EVERY 6 HOURS PRN
Status: DISCONTINUED | OUTPATIENT
Start: 2024-08-26 | End: 2024-08-26 | Stop reason: SDUPTHER

## 2024-08-26 RX ORDER — SODIUM CHLORIDE 0.9 % (FLUSH) 0.9 %
5-40 SYRINGE (ML) INJECTION PRN
Status: DISCONTINUED | OUTPATIENT
Start: 2024-08-26 | End: 2024-08-27

## 2024-08-26 RX ORDER — ONDANSETRON 2 MG/ML
4 INJECTION INTRAMUSCULAR; INTRAVENOUS EVERY 6 HOURS PRN
Status: DISCONTINUED | OUTPATIENT
Start: 2024-08-26 | End: 2024-08-27

## 2024-08-26 RX ORDER — DIPHENHYDRAMINE HCL 25 MG
25 TABLET ORAL EVERY 6 HOURS PRN
Status: ON HOLD | COMMUNITY
End: 2024-08-28 | Stop reason: HOSPADM

## 2024-08-26 RX ORDER — NALBUPHINE HYDROCHLORIDE 10 MG/ML
5 INJECTION, SOLUTION INTRAMUSCULAR; INTRAVENOUS; SUBCUTANEOUS EVERY 4 HOURS PRN
Status: DISCONTINUED | OUTPATIENT
Start: 2024-08-26 | End: 2024-08-27

## 2024-08-26 RX ORDER — NALOXONE HYDROCHLORIDE 0.4 MG/ML
INJECTION, SOLUTION INTRAMUSCULAR; INTRAVENOUS; SUBCUTANEOUS PRN
Status: DISCONTINUED | OUTPATIENT
Start: 2024-08-26 | End: 2024-08-27

## 2024-08-26 RX ORDER — DOCUSATE SODIUM 100 MG/1
100 CAPSULE, LIQUID FILLED ORAL 2 TIMES DAILY
Status: DISCONTINUED | OUTPATIENT
Start: 2024-08-26 | End: 2024-08-27

## 2024-08-26 RX ADMIN — Medication 25 MCG: at 01:40

## 2024-08-26 RX ADMIN — LIDOCAINE HYDROCHLORIDE,EPINEPHRINE BITARTRATE 8 ML: 20; .005 INJECTION, SOLUTION EPIDURAL; INFILTRATION; INTRACAUDAL; PERINEURAL at 17:21

## 2024-08-26 RX ADMIN — SODIUM CHLORIDE, POTASSIUM CHLORIDE, SODIUM LACTATE AND CALCIUM CHLORIDE: 600; 310; 30; 20 INJECTION, SOLUTION INTRAVENOUS at 13:22

## 2024-08-26 RX ADMIN — BUPIVACAINE HYDROCHLORIDE 10 ML: 2.5 INJECTION, SOLUTION EPIDURAL; INFILTRATION; INTRACAUDAL at 17:46

## 2024-08-26 RX ADMIN — SODIUM CHLORIDE, POTASSIUM CHLORIDE, SODIUM LACTATE AND CALCIUM CHLORIDE: 600; 310; 30; 20 INJECTION, SOLUTION INTRAVENOUS at 16:17

## 2024-08-26 RX ADMIN — Medication 7 MILLI-UNITS/MIN: at 22:52

## 2024-08-26 RX ADMIN — ONDANSETRON 4 MG: 2 INJECTION INTRAMUSCULAR; INTRAVENOUS at 23:27

## 2024-08-26 RX ADMIN — BUPIVACAINE HYDROCHLORIDE 6 ML: 2.5 INJECTION, SOLUTION EPIDURAL; INFILTRATION; INTRACAUDAL at 16:57

## 2024-08-26 RX ADMIN — BUPIVACAINE HYDROCHLORIDE 8 ML: 2.5 INJECTION, SOLUTION EPIDURAL; INFILTRATION; INTRACAUDAL at 16:13

## 2024-08-26 RX ADMIN — SODIUM CHLORIDE, SODIUM LACTATE, POTASSIUM CHLORIDE, AND CALCIUM CHLORIDE: .6; .31; .03; .02 INJECTION, SOLUTION INTRAVENOUS at 16:04

## 2024-08-26 RX ADMIN — SODIUM CHLORIDE, POTASSIUM CHLORIDE, SODIUM LACTATE AND CALCIUM CHLORIDE: 600; 310; 30; 20 INJECTION, SOLUTION INTRAVENOUS at 22:54

## 2024-08-26 RX ADMIN — LIDOCAINE HYDROCHLORIDE,EPINEPHRINE BITARTRATE 5 ML: 20; .005 INJECTION, SOLUTION EPIDURAL; INFILTRATION; INTRACAUDAL; PERINEURAL at 22:30

## 2024-08-26 RX ADMIN — FAMOTIDINE 20 MG: 10 INJECTION, SOLUTION INTRAVENOUS at 14:53

## 2024-08-26 RX ADMIN — LIDOCAINE HYDROCHLORIDE,EPINEPHRINE BITARTRATE 3 ML: 20; .005 INJECTION, SOLUTION EPIDURAL; INFILTRATION; INTRACAUDAL; PERINEURAL at 17:46

## 2024-08-26 RX ADMIN — BUPIVACAINE HYDROCHLORIDE 15 ML/HR: 5 INJECTION, SOLUTION EPIDURAL; INTRACAUDAL at 16:58

## 2024-08-26 RX ADMIN — Medication 1 MILLI-UNITS/MIN: at 09:06

## 2024-08-26 ASSESSMENT — ENCOUNTER SYMPTOMS: SHORTNESS OF BREATH: 0

## 2024-08-26 NOTE — ANESTHESIA PROCEDURE NOTES
Epidural Block    Patient location during procedure: OB  Start time: 8/26/2024 4:13 PM  End time: 8/26/2024 4:30 PM  Reason for block: labor epidural  Staffing  Performed by: Devan Schroeder APRN - CRNA  Authorized by: Gely Owusu MD    Epidural  Patient position: sitting  Prep: Betadine and site prepped and draped  Patient monitoring: continuous pulse ox and frequent blood pressure checks  Approach: midline  Location: L3-4  Injection technique: DARIUS saline  Guidance: paresthesia technique  Provider prep: mask and sterile gloves  Needle  Needle type: Tuohy   Needle gauge: 17 G  Needle length: 3.5 in  Needle insertion depth: 5 cm  Catheter type: side hole  Catheter size: 20 G  Catheter at skin depth: 10 cm  Test dose: negativeCatheter Secured: tegaderm  Assessment  Sensory level: T6  Hemodynamics: stable  Attempts: 1  Outcomes: patient tolerated procedure well  Additional Notes  Sterile tech., sitting position, Lidocaine skin wheel, secured with steri-strips, tegaderm dressing, dosed & infusion with Marcaine 0.125%, infusion rate at 4 ml every 20 minutes.  Preanesthetic Checklist  Completed: patient identified, IV checked, site marked, risks and benefits discussed, surgical/procedural consents, equipment checked, pre-op evaluation, timeout performed, anesthesia consent given, oxygen available, monitors applied/VS acknowledged, fire risk safety assessment completed and verbalized and blood product R/B/A discussed and consented

## 2024-08-26 NOTE — FLOWSHEET NOTE
39w Patient admitted to room 2282 for IOL for cholestasis.  Patient oriented to room, call light and plan of care.  Patient given opportunity to read all appropriate consents.  RN reviewed admission process, [facility] OB binder and infant safety/security processes and consents were signed.  Patient verbalized understanding and questions were answered and addressed.

## 2024-08-26 NOTE — ANESTHESIA PRE PROCEDURE
Department of Anesthesiology  Preprocedure Note       Name:  Trace Argueta   Age:  28 y.o.  :  1995                                          MRN:  [id number]         Date:  2024        Procedure: Labor Epidural    Medications prior to admission:   Prior to Admission medications    Medication Sig Start Date End Date Taking? Authorizing Provider   diphenhydrAMINE (BENADRYL) 25 MG tablet Take 1 tablet by mouth every 6 hours as needed for Itching   Yes Osito Tucker MD   ursodiol (ACTIGALL) 500 MG tablet Take 1 tablet by mouth 3 times daily 24  Yes Isamar Nugent MD   Multiple Vitamins-Minerals (THERAPEUTIC MULTIVITAMIN-MINERALS) tablet Take 1 tablet by mouth daily   Yes Osito Tucker MD   ferrous sulfate (IRON 325) 325 (65 Fe) MG tablet Take 1 tablet by mouth daily (with breakfast)    ProviderOsito MD       Current medications:    Current Facility-Administered Medications   Medication Dose Route Frequency Provider Last Rate Last Admin    lactated ringers IV soln infusion   IntraVENous Continuous Isamar Nugent MD        lactated ringers bolus 500 mL  500 mL IntraVENous PRN Isamar Nugent MD        Or    lactated ringers bolus 500 mL  500 mL IntraVENous PRN Isamar Nugent MD        sodium chloride flush 0.9 % injection 5-40 mL  5-40 mL IntraVENous 2 times per day Isamar Nugent MD        sodium chloride flush 0.9 % injection 5-40 mL  5-40 mL IntraVENous PRN Isamar Nugent MD        0.9 % sodium chloride infusion  25 mL IntraVENous PRN Isamar Nugent MD        methylergonovine (METHERGINE) injection 200 mcg  200 mcg IntraMUSCular PRN Isamar Nugent MD        carboprost (HEMABATE) injection 250 mcg  250 mcg IntraMUSCular PRN Isamar Nugent MD        miSOPROStol (CYTOTEC) tablet 400 mcg  400 mcg Buccal PRN Isamar Nugent MD        tranexamic acid-NaCl IVPB premix 1,000 mg  1,000 mg IntraVENous Once PRN Isamar Nugent MD

## 2024-08-26 NOTE — ANESTHESIA PROCEDURE NOTES
Epidural Block    Patient location during procedure: OB  Start time: 8/26/2024 5:46 PM  End time: 8/26/2024 5:54 PM  Reason for block: labor epidural  Staffing  Performed by: Devan Schroeder APRN - CRNA  Authorized by: Gely Owusu MD    Epidural  Patient position: sitting  Prep: Betadine and site prepped and draped  Patient monitoring: continuous pulse ox and frequent blood pressure checks  Approach: midline  Location: L3-4  Injection technique: DARIUS saline  Guidance: paresthesia technique  Provider prep: mask and sterile gloves  Needle  Needle type: Tuohy   Needle gauge: 17 G  Needle length: 3.5 in  Needle insertion depth: 6 cm  Catheter type: side hole  Catheter size: 20 G  Catheter at skin depth: 12 cm  Test dose: negativeCatheter Secured: tegaderm  Assessment  Sensory level: T6  Hemodynamics: stable  Attempts: 1  Outcomes: patient tolerated procedure well  Additional Notes  Patient states had no relief from first epidural. Redid epidural.  Preanesthetic Checklist  Completed: patient identified, IV checked, site marked, risks and benefits discussed, surgical/procedural consents, equipment checked, pre-op evaluation, timeout performed, anesthesia consent given, oxygen available, monitors applied/VS acknowledged, fire risk safety assessment completed and verbalized and blood product R/B/A discussed and consented

## 2024-08-26 NOTE — FLOWSHEET NOTE
Pt states that she feels no relief from contraction pain and rates contraction pain an 8/10. CARMEN Schroeder CRNA aware. CRNA to bedside and Pt sitting on the edge of the bed for epidural re-insertion at 1535. RN remains at Pt bedside holding Pt in position for epidural insertion.      08/26/24 1746   Maternal Vitals (MEW-T)   Pulse 82   Heart Rate Source Brachial   Respirations 20   /75  (epidural test dose)   SpO2 100 %     Epidural test dose at this time. RN remains at bedside.

## 2024-08-26 NOTE — H&P
Department of Obstetrics and Gynecology   Obstetrics History and Physical        CHIEF COMPLAINT:  itching    HISTORY OF PRESENT ILLNESS:    Trace Argueta  is a 28 y.o.  female at 39w0d presents with a chief complaint as above and is being admitted for induction secondary to Cholestasis of Pregnancy.    Estimated Due Date: Estimated Date of Delivery: 24    PRENATAL CARE: Complicated by: Cholestasis, H/o HSV    PAST OB HISTORY:  OB History          6    Para   4    Term   4            AB   1    Living   4         SAB   1    IAB        Ectopic        Molar        Multiple        Live Births   4              Past Medical History:    History reviewed. No pertinent past medical history.  Past Surgical History:        Procedure Laterality Date    APPENDECTOMY      CHOLECYSTECTOMY, LAPAROSCOPIC N/A 2024    LAPAROSCOPIC CHOLECYSTECTOMY WITH CHOLANGIOGRAM performed by Earl Dover MD at [facility] OR    WISDOM TOOTH EXTRACTION       Allergies:  Patient has no known allergies.  Social History:    Social History     Socioeconomic History    Marital status:      Spouse name: Not on file    Number of children: Not on file    Years of education: Not on file    Highest education level: Not on file   Occupational History    Not on file   Tobacco Use    Smoking status: Former    Smokeless tobacco: Never   Substance and Sexual Activity    Alcohol use: Yes     Comment: social    Drug use: No    Sexual activity: Yes     Partners: Male   Other Topics Concern    Not on file   Social History Narrative    Not on file     Social Determinants of Health     Financial Resource Strain: Not on file   Food Insecurity: No Food Insecurity (2024)    Hunger Vital Sign     Worried About Running Out of Food in the Last Year: Never true     Ran Out of Food in the Last Year: Never true   Transportation Needs: No Transportation Needs (2024)    PRAPARE - Transportation     Lack of Transportation

## 2024-08-26 NOTE — FLOWSHEET NOTE
Pt requesting epidural to be performed at 1600. Call placed to CARMEN Schroeder CRNA at 1525 to make aware. Pt remains in hands and knee positioning at this time in bed. RN remains at bedside adjusting external US and toco monitors.

## 2024-08-26 NOTE — FLOWSHEET NOTE
CARMEN Schroeder, CRNA at bedside for epidural insertion at 1600. Pt sitting up on the edge of the bed for epidural insertion and timeout performed at 1405. RN remains at Pt bedside holding Pt in position for epidural insertion.     08/26/24 1614   Maternal Vitals (MEW-T)   Pulse 70   Heart Rate Source Brachial   Respirations 20   /80  (epidural test dose)   SpO2 98 %     Epidural test dose performed at this time.

## 2024-08-26 NOTE — FLOWSHEET NOTE
Pt requesting Jael Novii placement. External US and toco monitors removed at 1112 and skin cleansed per  guidelines. Jael Novii not functioning appropriately, RN troubleshooting at the bedside. FHR confirmed per US monitor beginning at 1123 and US and toco monitors placed and noted to be tracing appropriately at 1130.

## 2024-08-26 NOTE — FLOWSHEET NOTE
Dr Nugent in to bedside and viewed morning tracing. SVE unchanged. AROM small amount clear fluid. Plan to wait and see if labor starts vs starting pitocin. Pt agreeable with plan of care.

## 2024-08-27 PROCEDURE — 7200000001 HC VAGINAL DELIVERY

## 2024-08-27 PROCEDURE — 6370000000 HC RX 637 (ALT 250 FOR IP): Performed by: OBSTETRICS & GYNECOLOGY

## 2024-08-27 PROCEDURE — 51702 INSERT TEMP BLADDER CATH: CPT

## 2024-08-27 PROCEDURE — 6360000002 HC RX W HCPCS: Performed by: ANESTHESIOLOGY

## 2024-08-27 PROCEDURE — 6360000002 HC RX W HCPCS: Performed by: NURSE ANESTHETIST, CERTIFIED REGISTERED

## 2024-08-27 PROCEDURE — 3E0P7VZ INTRODUCTION OF HORMONE INTO FEMALE REPRODUCTIVE, VIA NATURAL OR ARTIFICIAL OPENING: ICD-10-PCS | Performed by: OBSTETRICS & GYNECOLOGY

## 2024-08-27 PROCEDURE — 10907ZC DRAINAGE OF AMNIOTIC FLUID, THERAPEUTIC FROM PRODUCTS OF CONCEPTION, VIA NATURAL OR ARTIFICIAL OPENING: ICD-10-PCS | Performed by: OBSTETRICS & GYNECOLOGY

## 2024-08-27 PROCEDURE — 2580000003 HC RX 258: Performed by: NURSE ANESTHETIST, CERTIFIED REGISTERED

## 2024-08-27 PROCEDURE — 6360000002 HC RX W HCPCS: Performed by: OBSTETRICS & GYNECOLOGY

## 2024-08-27 PROCEDURE — 59025 FETAL NON-STRESS TEST: CPT

## 2024-08-27 PROCEDURE — 1220000000 HC SEMI PRIVATE OB R&B

## 2024-08-27 PROCEDURE — 2500000003 HC RX 250 WO HCPCS: Performed by: NURSE ANESTHETIST, CERTIFIED REGISTERED

## 2024-08-27 PROCEDURE — 3E033VJ INTRODUCTION OF OTHER HORMONE INTO PERIPHERAL VEIN, PERCUTANEOUS APPROACH: ICD-10-PCS | Performed by: OBSTETRICS & GYNECOLOGY

## 2024-08-27 RX ORDER — SODIUM CHLORIDE 9 MG/ML
INJECTION, SOLUTION INTRAVENOUS PRN
Status: DISCONTINUED | OUTPATIENT
Start: 2024-08-27 | End: 2024-08-28 | Stop reason: HOSPADM

## 2024-08-27 RX ORDER — ONDANSETRON 4 MG/1
4 TABLET, ORALLY DISINTEGRATING ORAL EVERY 6 HOURS PRN
Status: DISCONTINUED | OUTPATIENT
Start: 2024-08-27 | End: 2024-08-28 | Stop reason: HOSPADM

## 2024-08-27 RX ORDER — OXYCODONE HYDROCHLORIDE 5 MG/1
5 TABLET ORAL EVERY 4 HOURS PRN
Status: DISCONTINUED | OUTPATIENT
Start: 2024-08-27 | End: 2024-08-28 | Stop reason: HOSPADM

## 2024-08-27 RX ORDER — NICOTINE 21 MG/24HR
1 PATCH, TRANSDERMAL 24 HOURS TRANSDERMAL DAILY
Status: DISCONTINUED | OUTPATIENT
Start: 2024-08-27 | End: 2024-08-28 | Stop reason: HOSPADM

## 2024-08-27 RX ORDER — DOCUSATE SODIUM 100 MG/1
100 CAPSULE, LIQUID FILLED ORAL 2 TIMES DAILY
Status: DISCONTINUED | OUTPATIENT
Start: 2024-08-27 | End: 2024-08-28 | Stop reason: HOSPADM

## 2024-08-27 RX ORDER — IBUPROFEN 800 MG/1
800 TABLET, FILM COATED ORAL EVERY 8 HOURS PRN
Status: DISCONTINUED | OUTPATIENT
Start: 2024-08-27 | End: 2024-08-27 | Stop reason: ALTCHOICE

## 2024-08-27 RX ORDER — ACETAMINOPHEN 500 MG
1000 TABLET ORAL EVERY 6 HOURS PRN
Status: DISCONTINUED | OUTPATIENT
Start: 2024-08-27 | End: 2024-08-28 | Stop reason: HOSPADM

## 2024-08-27 RX ORDER — SODIUM CHLORIDE 0.9 % (FLUSH) 0.9 %
5-40 SYRINGE (ML) INJECTION PRN
Status: DISCONTINUED | OUTPATIENT
Start: 2024-08-27 | End: 2024-08-28 | Stop reason: HOSPADM

## 2024-08-27 RX ORDER — LANOLIN 100 %
OINTMENT (GRAM) TOPICAL PRN
Status: DISCONTINUED | OUTPATIENT
Start: 2024-08-27 | End: 2024-08-28 | Stop reason: HOSPADM

## 2024-08-27 RX ORDER — KETOROLAC TROMETHAMINE 30 MG/ML
30 INJECTION, SOLUTION INTRAMUSCULAR; INTRAVENOUS EVERY 8 HOURS PRN
Status: DISCONTINUED | OUTPATIENT
Start: 2024-08-27 | End: 2024-08-28 | Stop reason: HOSPADM

## 2024-08-27 RX ORDER — ONDANSETRON 2 MG/ML
4 INJECTION INTRAMUSCULAR; INTRAVENOUS EVERY 6 HOURS PRN
Status: DISCONTINUED | OUTPATIENT
Start: 2024-08-27 | End: 2024-08-28 | Stop reason: HOSPADM

## 2024-08-27 RX ORDER — SODIUM CHLORIDE 0.9 % (FLUSH) 0.9 %
5-40 SYRINGE (ML) INJECTION EVERY 12 HOURS SCHEDULED
Status: DISCONTINUED | OUTPATIENT
Start: 2024-08-27 | End: 2024-08-28 | Stop reason: HOSPADM

## 2024-08-27 RX ADMIN — IBUPROFEN 800 MG: 800 TABLET, FILM COATED ORAL at 04:59

## 2024-08-27 RX ADMIN — ACETAMINOPHEN 1000 MG: 500 TABLET ORAL at 08:28

## 2024-08-27 RX ADMIN — LIDOCAINE HYDROCHLORIDE,EPINEPHRINE BITARTRATE 5 ML: 20; .005 INJECTION, SOLUTION EPIDURAL; INFILTRATION; INTRACAUDAL; PERINEURAL at 00:54

## 2024-08-27 RX ADMIN — Medication 166.7 ML: at 02:44

## 2024-08-27 RX ADMIN — ONDANSETRON 4 MG: 2 INJECTION INTRAMUSCULAR; INTRAVENOUS at 01:40

## 2024-08-27 RX ADMIN — KETOROLAC TROMETHAMINE 30 MG: 30 INJECTION, SOLUTION INTRAMUSCULAR at 21:06

## 2024-08-27 RX ADMIN — DOCUSATE SODIUM 100 MG: 100 CAPSULE, LIQUID FILLED ORAL at 21:05

## 2024-08-27 RX ADMIN — ACETAMINOPHEN 1000 MG: 500 TABLET ORAL at 16:49

## 2024-08-27 RX ADMIN — Medication 909.1 MILLI-UNITS/MIN: at 02:44

## 2024-08-27 RX ADMIN — KETOROLAC TROMETHAMINE 30 MG: 30 INJECTION, SOLUTION INTRAMUSCULAR at 12:55

## 2024-08-27 RX ADMIN — DOCUSATE SODIUM 100 MG: 100 CAPSULE, LIQUID FILLED ORAL at 08:28

## 2024-08-27 ASSESSMENT — PAIN SCALES - GENERAL
PAINLEVEL_OUTOF10: 2
PAINLEVEL_OUTOF10: 0
PAINLEVEL_OUTOF10: 2
PAINLEVEL_OUTOF10: 1
PAINLEVEL_OUTOF10: 2
PAINLEVEL_OUTOF10: 0
PAINLEVEL_OUTOF10: 6
PAINLEVEL_OUTOF10: 6
PAINLEVEL_OUTOF10: 4
PAINLEVEL_OUTOF10: 3
PAINLEVEL_OUTOF10: 1

## 2024-08-27 ASSESSMENT — PAIN DESCRIPTION - PAIN TYPE
TYPE: ACUTE PAIN

## 2024-08-27 ASSESSMENT — PAIN - FUNCTIONAL ASSESSMENT
PAIN_FUNCTIONAL_ASSESSMENT: ACTIVITIES ARE NOT PREVENTED

## 2024-08-27 ASSESSMENT — PAIN DESCRIPTION - DESCRIPTORS
DESCRIPTORS: CRAMPING

## 2024-08-27 ASSESSMENT — PAIN DESCRIPTION - ONSET
ONSET: ON-GOING

## 2024-08-27 ASSESSMENT — PAIN DESCRIPTION - FREQUENCY
FREQUENCY: INTERMITTENT

## 2024-08-27 ASSESSMENT — PAIN DESCRIPTION - LOCATION
LOCATION: ABDOMEN

## 2024-08-27 ASSESSMENT — PAIN DESCRIPTION - ORIENTATION
ORIENTATION: LOWER;MID
ORIENTATION: LOWER
ORIENTATION: LOWER;MID
ORIENTATION: LOWER
ORIENTATION: LOWER;MID

## 2024-08-27 NOTE — LACTATION NOTE
This note was copied from a baby's chart.  Lactation Progress Note    Data: Follow up. Mother called for LC, states infant ready to feed.    Action: LC to room. Mother resting in bed, holding infant skin to skin. Infant asleep showing cues. With permission, LC performed oral exam.  HORTENCIA used the TABBY Tongue Assessment Tool to evaluate oral appearance and function and scored this infant with 4 out of 8.        0 1 2 Score   What does the  tongue-tip look like?      1   Where it is fixed  to the gum?      1   How high can it lift  (wide open mouth)?      1   How far can it  stick out?      1   BIN Monroe, KELLIE Wyman, KELVIN Monroy. et al. The development and evaluation of a picture tongue assessment tool for tongue-tie in  babies (TABBY). Int Breastfeed J 14, 31 (2019). https://doi.org/10.1186/h26669-388-6484-w.    Infant woke with exam so placed to right breast in football hold, using pillows for support.    Reviewed feeding positions and latch on techniques. Encouraged lining nose to nipple, holding belly to belly and waiting for wide open mouth before quickly bringing baby onto breast to ensure a deep latch.  Discussed importance of obtaining deep latch to ensure proper milk transfer, establish milk production, maintain milk supply and improve maternal comfort. Reviewed  signs of milk transfer, output goals and normal feeding/sleeping behaviors for the first 24 hours and beyond.  Encouraged offering both breasts each feeding and how to alternate breasts.     Encouraged pointing nipple to nose, allowing infant to tilt head back, which helps him open wider. Infant able to latch on comfortably after several attempts, sustaining active sucking with audible swallows.  LC taught and mother returned demo for recognizing swallows (visual and/or audible) and satiety.  Once he self detached, mother offered left side. Mother able to independently latch him on deeply, with active sucks and swallows. Mother states feels

## 2024-08-27 NOTE — FLOWSHEET NOTE
08/27/24 0100   Fetal Heart Rate   Mode External US   Baseline Rate 125 bpm   Baseline Classification Normal   Variability 6-25 BPM   Pattern Accelerations   Patient Feels Fetal Movement Yes   Fetal Monitoring Strip   FMS Reviewed? Yes   FMS Reviewed By? MPH,RN   Uterine Activity   UA Mode Palpation;Aspers   Contraction Frequency 1-3   Contraction Duration 40-90   Contraction Intensity Moderate   Resting Tone Palpated Soft

## 2024-08-27 NOTE — LACTATION NOTE
This note was copied from a baby's chart.  Lactation Consult Note    Data: Consult received and appreciated. LC reviewed chart and spoke with bedside RN.    Maternal History: cholestasis, IOL    OB/Delivery Risks for Lactation: same as above, 5th baby    Breast pump for home use: HORTENCIA will fax Rx to [facility] for spectra s2 per mother's request    Action: LC to room. Introduced self and lactation services. Mother agreeable to consult at this time.  Mother resting in bed. Infant sleeping, swaddled in bassinet, showing no hunger cues at this time. Mother states breastfeeding is going okay so far. Mother states infant is latching to both sides, sometimes pinching/painful at first but improves after several suck bursts. Mother concerned baby may be tongue tied.   Mother states she last tried to feed around 7 so plans again around 10.     LC reviewed Care Plan for First 24 Hours of Life.  Discussed recognizing hunger cues and offering the breast when cues are shown. Encouraged breastfeeding on demand and attempting/offering at least every 3 hours. Encouraged unlimited skin to skin contact with infant and reviewed benefits including better temperature, heart rate, respiration, blood pressure, and blood sugar regulation. Also increased bonding and milk supply associated with skin to skin contact.     Reviewed milk supply/production process and when to expect milk volumes to increase and milk to transition in. Reviewed engorgement management and comfort techniques. Encouraged continued feeding on demand, watching for hunger cues, taking Motrin as prescribed, and applying ice/cold packs for comfort. Reviewed what to watch for and when to notify provider.    LC provided breastfeeding packet/handouts and encouraged mother to read for additional information about breastfeeding, including resources for after discharge including option of making outpatient lactation visit if needed.    Encouraged mother to call for LC when

## 2024-08-27 NOTE — FLOWSHEET NOTE
Dr. Nugent at bedside. Patient actively pushing.  RN remains in continuous attendance at the bedside.  Assessment & evaluation of fetal heart rate ongoing via continuous EFM.

## 2024-08-27 NOTE — FLOWSHEET NOTE
SVE performed at this time as Pt states \" I think he's coming\" Cervix is completely dilated, fetal station +2

## 2024-08-27 NOTE — FLOWSHEET NOTE
RN remained at bedside throughout pushing.  EFM continuously assessed.  Vaginal delivery of viable infant male at 0237 per Dr. Nugent assistance. Infant dried and stimulated on mother's abdomen and lusty cry noted prior to one minute of life. Infant cord clamped and cut at 0244 and infant placed skin to skin immediately with mother following. Apgars 9/9

## 2024-08-27 NOTE — FLOWSHEET NOTE
Assessments and vital signs completed, documented in flowsheets. All findings WNL. White board updated. Plan of care for the day discussed. Patient verbalized understanding and had no further questions.

## 2024-08-27 NOTE — FLOWSHEET NOTE
Call placed to CARMEN Schroeder to make aware of Pt's c/o vaginal pain during contractions. CRNA en route to bedside to re-dose Pt's epidural.

## 2024-08-27 NOTE — FLOWSHEET NOTE
Pt ambulated to the restroom and voided 300 ml without difficulty. Joann care explained and pt verbalized understanding. Pt tranferred to room 2263 via wheelchair with support person pushing baby in the crib. Pt oriented to the room and the current plan of care for the shift. All questions answered and needs addressed.

## 2024-08-27 NOTE — L&D DELIVERY NOTE
Department of Obstetrics and Gynecology  Induced Vaginal Delivery Note    Trace Argueta,[id number]    PRENATAL CARE: Complicated by: Cholestasis    Pre-operative Diagnosis:  Term pregnancy, Induced labor, and Single fetus      Post-operative Diagnosis: the same    Type of induction: pitocin and cytotec    Additional Procedures: none     Information for the patient's :  Karthik Argueta [[id number]]                  Infant Wt:   Information for the patient's :  Karthik Argueta [[id number]]          APGARS:     Information for the patient's :  Karthik Argueta [[id number]]         Anesthesia:  epidural anesthesia    Specimen:  Placenta sent to pathology No    Estimated blood loss: 100 cc     Condition: mother and infant stable in LDR    Infant Feeding: breast    Delivery Summary: Patient is Trace Argueta  is a 28 y.o.  female at 39w1d admitted to Labor and Delivery room for induction of labor and placed on external monitors. After FHT were confirmed to be reassuring the induction proceeded with 1 dose of cytotec for ripening by protocol. Contractions were regular, FHT reactive with moderate variability without decels. AROM was performed for clear fluid and the patient was then started on pitocin. Pt did received epidural anesthesia. Progress of labor as anticipated and now fully dilated cervix. With subsequent contractions she started pushing and delivered vaginally spontaneously with no complications. 30 Units of Pitocin in 500 ml of LR was started IV. Placenta, cord and membranes were delivered spontaneously within less than 15 minutes. Uterus was not explored.  Vaginal walls, cervix, perineum, rectum were intact on inspection. Uterus was well contracted. Vaginal sweep was done, laps count was correct. Mother and  left stable to recovery.     Electronically signed by Isamar Nugent MD on 2024 at 2:58 AM

## 2024-08-27 NOTE — ANESTHESIA POSTPROCEDURE EVALUATION
Department of Anesthesiology  Postprocedure Note    Patient: Trace Argueta  MRN: [id number]  YOB: 1995  Date of evaluation: 8/27/2024    Procedure Summary       Date: 08/26/24 Room / Location:     Anesthesia Start: 1604 Anesthesia Stop: 08/27/24 0247    Procedure: Labor Analgesia Diagnosis:     Scheduled Providers:  Responsible Provider: Gely Owusu MD    Anesthesia Type: epidural ASA Status: 2            Anesthesia Type: No value filed.    Yasir Phase I: Yasir Score: 10    Yasir Phase II:      Anesthesia Post Evaluation    Patient location during evaluation: floor  Patient participation: complete - patient participated  Level of consciousness: awake and alert  Pain score: 2  Airway patency: patent  Nausea & Vomiting: no vomiting and no nausea  Cardiovascular status: hemodynamically stable  Respiratory status: room air, spontaneous ventilation and acceptable  Hydration status: stable  Pain management: inadequate        No notable events documented.

## 2024-08-27 NOTE — FLOWSHEET NOTE
Call placed to CARMEN Schroeder CRNA to make aware of Pt c/o vaginal and back pain with contractions. Pt also states that she feels full sensation while sotelo catheter was placed. CRNA en route to re-dose epidural.

## 2024-08-27 NOTE — ANESTHESIA POSTPROCEDURE EVALUATION
Department of Anesthesiology  Postprocedure Note    Patient: Trace Argueta  MRN: [id number]  YOB: 1995  Date of evaluation: 8/27/2024    Procedure Summary       Date: 08/26/24 Room / Location:     Anesthesia Start: 1604 Anesthesia Stop: 08/27/24 0247    Procedure: Labor Analgesia Diagnosis:     Scheduled Providers:  Responsible Provider: Gely Owusu MD    Anesthesia Type: epidural ASA Status: 2            Anesthesia Type: No value filed.    Yasir Phase I: Yasir Score: 10    Yasir Phase II: Yasir Score: 10    Anesthesia Post Evaluation    Patient location during evaluation: floor  Patient participation: complete - patient participated  Level of consciousness: awake and alert  Pain score: 0  Airway patency: patent  Nausea & Vomiting: no vomiting and no nausea  Cardiovascular status: blood pressure returned to baseline and hemodynamically stable  Respiratory status: acceptable and room air  Hydration status: stable  Pain management: adequate and satisfactory to patient        No notable events documented.

## 2024-08-27 NOTE — PLAN OF CARE
Problem: Vaginal Birth or  Section  Goal: Fetal and maternal status remain reassuring during the birth process  Description:  Birth OB-Pregnancy care plan goal which identifies if the fetal and maternal status remain reassuring during the birth process  2024 by Brittney Dennis RN  Outcome: Completed     Problem: Postpartum  Goal: Experiences normal postpartum course  Description:  Postpartum OB-Pregnancy care plan goal which identifies if the mother is experiencing a normal postpartum course  2024 by Brittney Dennis RN  Outcome: Progressing     Problem: Postpartum  Goal: Appropriate maternal -  bonding  Description:  Postpartum OB-Pregnancy care plan goal which identifies if the mother and  are bonding appropriately  2024 by Brittney Dennis RN  Outcome: Progressing     Problem: Postpartum  Goal: Establishment of infant feeding pattern  Description:  Postpartum OB-Pregnancy care plan goal which identifies if the mother is establishing a feeding pattern with their   2024 by Brittney Dennis RN  Outcome: Progressing     Problem: Postpartum  Goal: Incisions, wounds, or drain sites healing without S/S of infection  2024 by Brittney Dennis RN  Outcome: Progressing     Problem: Pain  Goal: Verbalizes/displays adequate comfort level or baseline comfort level  2024 by Brittney Dennis RN  Outcome: Progressing     Problem: Infection - Adult  Goal: Absence of infection at discharge  2024 by Brittney Dennis RN  Outcome: Progressing     Problem: Infection - Adult  Goal: Absence of infection during hospitalization  2024 by Brittney Dennis RN  Outcome: Progressing     Problem: Infection - Adult  Goal: Absence of fever/infection during anticipated neutropenic period  2024 by Brittney Dennis RN  Outcome: Progressing     Problem: Safety - Adult  Goal: Free from fall injury  2024 by

## 2024-08-28 VITALS
OXYGEN SATURATION: 98 % | TEMPERATURE: 98 F | HEIGHT: 67 IN | BODY MASS INDEX: 31.55 KG/M2 | RESPIRATION RATE: 18 BRPM | SYSTOLIC BLOOD PRESSURE: 116 MMHG | HEART RATE: 66 BPM | DIASTOLIC BLOOD PRESSURE: 75 MMHG | WEIGHT: 201 LBS

## 2024-08-28 PROBLEM — Z34.90 ENCOUNTER FOR INDUCTION OF LABOR: Status: RESOLVED | Noted: 2024-08-26 | Resolved: 2024-08-28

## 2024-08-28 PROCEDURE — 6370000000 HC RX 637 (ALT 250 FOR IP): Performed by: OBSTETRICS & GYNECOLOGY

## 2024-08-28 PROCEDURE — 6360000002 HC RX W HCPCS: Performed by: OBSTETRICS & GYNECOLOGY

## 2024-08-28 RX ADMIN — KETOROLAC TROMETHAMINE 30 MG: 30 INJECTION, SOLUTION INTRAMUSCULAR at 05:13

## 2024-08-28 RX ADMIN — KETOROLAC TROMETHAMINE 30 MG: 30 INJECTION, SOLUTION INTRAMUSCULAR at 13:12

## 2024-08-28 RX ADMIN — ACETAMINOPHEN 1000 MG: 500 TABLET ORAL at 00:52

## 2024-08-28 RX ADMIN — ACETAMINOPHEN 1000 MG: 500 TABLET ORAL at 09:04

## 2024-08-28 ASSESSMENT — PAIN SCALES - GENERAL
PAINLEVEL_OUTOF10: 4
PAINLEVEL_OUTOF10: 3
PAINLEVEL_OUTOF10: 5
PAINLEVEL_OUTOF10: 6

## 2024-08-28 ASSESSMENT — PAIN DESCRIPTION - PAIN TYPE: TYPE: ACUTE PAIN

## 2024-08-28 ASSESSMENT — PAIN DESCRIPTION - ORIENTATION
ORIENTATION: LOWER
ORIENTATION: LOWER

## 2024-08-28 ASSESSMENT — PAIN DESCRIPTION - LOCATION
LOCATION: ABDOMEN
LOCATION: ABDOMEN

## 2024-08-28 ASSESSMENT — PAIN DESCRIPTION - DESCRIPTORS
DESCRIPTORS: CRAMPING

## 2024-08-28 ASSESSMENT — PAIN - FUNCTIONAL ASSESSMENT
PAIN_FUNCTIONAL_ASSESSMENT: ACTIVITIES ARE NOT PREVENTED
PAIN_FUNCTIONAL_ASSESSMENT: ACTIVITIES ARE NOT PREVENTED

## 2024-08-28 NOTE — PLAN OF CARE
Problem: Postpartum  Goal: Experiences normal postpartum course  Description:  Postpartum OB-Pregnancy care plan goal which identifies if the mother is experiencing a normal postpartum course  Outcome: Progressing  Goal: Appropriate maternal -  bonding  Description:  Postpartum OB-Pregnancy care plan goal which identifies if the mother and  are bonding appropriately  Outcome: Progressing  Goal: Establishment of infant feeding pattern  Description:  Postpartum OB-Pregnancy care plan goal which identifies if the mother is establishing a feeding pattern with their   Outcome: Progressing  Goal: Incisions, wounds, or drain sites healing without S/S of infection  Outcome: Progressing     Problem: Pain  Goal: Verbalizes/displays adequate comfort level or baseline comfort level  Outcome: Progressing

## 2024-08-28 NOTE — DISCHARGE INSTRUCTIONS
Please call and schedule an appointment to be seen by your Obstetrician in 6 weeks.  You will need to call and make your own appointment.    For Breastfeeding moms, you can contact our lactation consultants with any problems or questions.  Please call [phone] for questions.    Diet  Eat a well balanced diet focusing on foods high in fiber and protein.  Drink plenty of fluids, especially water.    Activity  Gradually increase your activity.  Resume exercise only after advise by your doctor or midwife.  Avoid driving until your doctor or midwife has given their approval.  Rise slowly from a lying to sitting and then a standing position.  Climb stairs one at a time. Use caution when carrying your baby up and down the stairs.  NO SEXUAL activity for 6 weeks or until advised by your doctor.  Nothing in the vagina.  No tampons, no douches and no intercourse.  Be prepared to discuss family planning at your follow-up OB visit.  You may feel tired or have a lack of energy.  You may continue your prenatal vitamin to replenish nutrients post delivery.  Nap when your baby naps.    EMOTIONS  You may feel carlos, sad, teary and/or overwhelmed.  Contact your OB provider if you feel you may be showing signs of postpartum depression or have thoughts of harming yourself or your baby.  If infant will not stop crying, contact another adult for help.  Place the infant in his/her crib and step away for a break.  NEVER shake your baby.      BLEEDING  Vaginal bleeding will decrease in amount over the next few weeks.  You will notice that as your activity increases, you flow may increase.  This is your body's way of telling you to \"take it easy\" and rest.  Call your OB if you are saturating more than one maxi pad in an hour and resting does not help.      BREAST CARE  Take pain medications as needed and as prescribed by your OB provider  If you develop a warm, red, tender area on your breast or develop a fever contact your OB

## 2024-08-28 NOTE — PLAN OF CARE
Problem: Postpartum  Goal: Experiences normal postpartum course  Description:  Postpartum OB-Pregnancy care plan goal which identifies if the mother is experiencing a normal postpartum course  2024 by Alexa Singh RN  Outcome: Adequate for Discharge  2024 by Smiley Galeano RN  Outcome: Progressing  Goal: Appropriate maternal -  bonding  Description:  Postpartum OB-Pregnancy care plan goal which identifies if the mother and  are bonding appropriately  2024 by Alexa Singh RN  Outcome: Adequate for Discharge  2024 by Smiley Galeano RN  Outcome: Progressing  Goal: Establishment of infant feeding pattern  Description:  Postpartum OB-Pregnancy care plan goal which identifies if the mother is establishing a feeding pattern with their   2024 by Alexa Singh RN  Outcome: Adequate for Discharge  2024 by Smiley Galeano RN  Outcome: Progressing  Goal: Incisions, wounds, or drain sites healing without S/S of infection  2024 by Alexa Singh RN  Outcome: Adequate for Discharge  2024 by Smiley Galeano RN  Outcome: Progressing     Problem: Pain  Goal: Verbalizes/displays adequate comfort level or baseline comfort level  2024 by Alexa Singh RN  Outcome: Adequate for Discharge  2024 by Smiley Galeano RN  Outcome: Progressing     Problem: Infection - Adult  Goal: Absence of infection at discharge  2024 by Alexa Singh RN  Outcome: Adequate for Discharge  Flowsheets (Taken 2024 09 by Miranda Saini, RN)  Absence of infection at discharge:   Assess and monitor for signs and symptoms of infection   Monitor lab/diagnostic results  2024 by Smiley Galeano RN  Outcome: Progressing  Goal: Absence of infection during hospitalization  2024 by Alexa Singh RN  Outcome: Adequate for Discharge  Flowsheets (Taken 2024 09 by Miranda Saini,

## 2024-08-28 NOTE — FLOWSHEET NOTE
Discussed pain management and breastfeeding with pt. Pt states pain management has improved with IV pain medication. Also feels breastfeeding is going well and has no needs/concerns at his time.

## (undated) DEVICE — SUTURE ABSORBABLE L 18 IN SZ 4-0 NDL L 19 MM POLYGLACTIN 910 36/BX

## (undated) DEVICE — GOWN,AURORA,NONREINF,RAGLAN,XXL,STERILE: Brand: MEDLINE

## (undated) DEVICE — APPLIER CLP M L L11.4IN DIA10MM ENDOSCP ROT MULT FOR LIG

## (undated) DEVICE — GENERAL LAPAROSCOPY: Brand: MEDLINE INDUSTRIES, INC.

## (undated) DEVICE — LIQUIBAND RAPID ADHESIVE 36/CS 0.8ML: Brand: MEDLINE

## (undated) DEVICE — SYRINGE MED 20ML STD CLR PLAS LUERLOCK TIP N CTRL DISP

## (undated) DEVICE — TROCAR: Brand: KII SLEEVE

## (undated) DEVICE — ELECTRODE PT RET AD L9FT HI MOIST COND ADH HYDRGEL CORDED

## (undated) DEVICE — GLOVE SURG SZ 8 L12IN FNGR THK79MIL GRN LTX FREE

## (undated) DEVICE — ADTEC SINGLE USE HOOK SCISSORS, SHAFT ONLY, MONOPOLAR, STRAIGHT, WORKING LENGTH: 12 1/4", (310 MM), DIAM. 5 MM, BLUNT/BLUNT, INSULATED, SINGLE ACTION, STERILE, DISPOSABLE, PACKAGE OF 10 PIECES: Brand: AESCULAP

## (undated) DEVICE — TROCAR: Brand: KII SHIELDED BLADED ACCESS SYSTEM

## (undated) DEVICE — INSUFFLATION NEEDLE TO ESTABLISH PNEUMOPERITONEUM.: Brand: INSUFFLATION NEEDLE

## (undated) DEVICE — SUTURE VICRYL + SZ 0 L27IN ABSRB VLT L26MM UR-6 5/8 CIR VCP603H

## (undated) DEVICE — TISSUE RETRIEVAL SYSTEM: Brand: INZII RETRIEVAL SYSTEM

## (undated) DEVICE — MERCY HEALTH WEST TURNOVER: Brand: MEDLINE INDUSTRIES, INC.

## (undated) DEVICE — DECANTER BTL 6IN: Brand: MEDLINE INDUSTRIES, INC.

## (undated) DEVICE — PUMP SUC IRR TBNG L10FT W/ HNDPC ASSEMB STRYKEFLOW 2

## (undated) DEVICE — LOOP LIG SUT SZ 0 L18IN ABSRB POLYDIOXANONE MFIL PDS II

## (undated) DEVICE — GLOVE ORANGE PI 7 1/2   MSG9075

## (undated) DEVICE — SYRINGE MED 30ML STD CLR PLAS LUERLOCK TIP N CTRL DISP

## (undated) DEVICE — GARMENT COMPR STD FOR 17IN CALF UNIF THER FLOTRN

## (undated) DEVICE — COVER LT HNDL BLU PLAS